# Patient Record
Sex: FEMALE | Race: WHITE | NOT HISPANIC OR LATINO
[De-identification: names, ages, dates, MRNs, and addresses within clinical notes are randomized per-mention and may not be internally consistent; named-entity substitution may affect disease eponyms.]

---

## 2019-02-27 ENCOUNTER — APPOINTMENT (OUTPATIENT)
Dept: ORTHOPEDIC SURGERY | Facility: CLINIC | Age: 84
End: 2019-02-27
Payer: MEDICARE

## 2019-02-27 VITALS — BODY MASS INDEX: 30.73 KG/M2 | HEIGHT: 64 IN | WEIGHT: 180 LBS

## 2019-02-27 VITALS — DIASTOLIC BLOOD PRESSURE: 78 MMHG | SYSTOLIC BLOOD PRESSURE: 148 MMHG | HEART RATE: 88 BPM

## 2019-02-27 DIAGNOSIS — M17.10 UNILATERAL PRIMARY OSTEOARTHRITIS, UNSPECIFIED KNEE: ICD-10-CM

## 2019-02-27 PROCEDURE — 99203 OFFICE O/P NEW LOW 30 MIN: CPT

## 2019-02-27 PROCEDURE — 73562 X-RAY EXAM OF KNEE 3: CPT

## 2019-02-27 PROCEDURE — 20610 DRAIN/INJ JOINT/BURSA W/O US: CPT

## 2019-02-27 RX ORDER — CLONIDINE HYDROCHLORIDE 0.1 MG/1
0.1 TABLET ORAL
Qty: 270 | Refills: 0 | Status: ACTIVE | COMMUNITY
Start: 2018-06-06

## 2019-02-27 RX ORDER — FUROSEMIDE 20 MG/1
20 TABLET ORAL
Qty: 15 | Refills: 0 | Status: ACTIVE | COMMUNITY
Start: 2017-10-19

## 2019-02-27 RX ORDER — WARFARIN 3 MG/1
3 TABLET ORAL
Qty: 30 | Refills: 0 | Status: ACTIVE | COMMUNITY
Start: 2018-02-02

## 2019-02-27 RX ORDER — METOPROLOL SUCCINATE 25 MG/1
25 TABLET, EXTENDED RELEASE ORAL
Qty: 30 | Refills: 0 | Status: ACTIVE | COMMUNITY
Start: 2017-10-05

## 2019-02-27 RX ORDER — AMLODIPINE BESYLATE 10 MG/1
10 TABLET ORAL
Qty: 30 | Refills: 0 | Status: ACTIVE | COMMUNITY
Start: 2017-10-19

## 2019-02-27 RX ORDER — SIMVASTATIN 10 MG/1
10 TABLET, FILM COATED ORAL
Qty: 30 | Refills: 0 | Status: ACTIVE | COMMUNITY
Start: 2017-10-25

## 2019-02-27 NOTE — HISTORY OF PRESENT ILLNESS
[de-identified] : 83 yo female presents with her daughter and grandson for initial evaluation chronic left knee pain, becoming  progressively worse over the past few years.   Pain is primarily about the medial and patellar aspect of the knee.  She has pain with ambulation, is able to walk only about 10 feet before stopping to rest.  Pain interferes with sleep.  She ambulates with a cane indoors and out.  S/p left knee arthroscopy at [facility] in 2006. Has received annual cortisone injections, most recently administered 9/26/18.  The injections are becoming less and less effective.  \par \par PMHx:\par A Fibrillation on Warfarin\par CVA in 2012\par \par

## 2019-02-27 NOTE — DISCUSSION/SUMMARY
[de-identified] : Advanced arthritis left knee. Poor candidate/ high risk for knee replacement surgery. Recommend conservative care. Aspiration/cortisone injection today for pain relief. Physical therapy prescribed. Prescribed a walker for ambulation support.

## 2019-02-27 NOTE — PHYSICAL EXAM
[Antalgic] : antalgic [Cane] : ambulates with cane [LE] : Sensory: Intact in bilateral lower extremities [DP] : dorsalis pedis 2+ and symmetric bilaterally [Knee Anterior Drawer Sign Right] : negative anterior drawer sign [Knee Posterior Drawer Sign Right] : negative posterior drawer sign [Knee Instability Laxity Right Anterior Cruciate Ligament] : negative Lachman's test [Knee Meniscal Integ Norbert's Displace Test Right Medial] : negative Norbert's medially [Knee Meniscal Integ Norbert's Displace Test Right Lateral] : negative Norbert's laterally [Knee Medial Instability Right] : no laxity on valgus stress [Knee Lateral Instability Right] : no laxity on varus stress [Knee Swelling Left] : swelling [Knee Tenderness On Palpation Left] : tenderness [Patellofemoral Apprehension Test Left] : negative patellofemoral apprehension test [Knee Anterior Drawer Sign Left] : negative anterior drawer sign [Knee Posterior Drawer Sign Left] : negative posterior drawer sign [Knee Instability Laxity Left Anterior Cruciate Ligament] : negative Lachman's test [Knee Meniscal Integ Norbert's Displace Test Left Medial] : positive Norbert's medially [Knee Meniscal Integ Norbert's Displace Test Left Lateral] : negative Norbert's laterally [Knee Tender On Palp With Quadriceps Contracted (Shrug Sign)] : positive patellar grind [Knee Medial Instability Left] : no laxity on valgus stress [Knee Lateral Instability Left] : no  laxity on varus stress [Normal RLE] : Right Lower Extremity: No scars, rashes, lesions, ulcers, skin intact [Normal LLE] : Left Lower Extremity: No scars, rashes, lesions, ulcers, skin intact [Normal] : Alert and in no acute distress [de-identified] : Varus deformity left knee with  large effusion/knee warmth. 5-90° knee flexion [de-identified] : X-rays left knee AP, lateral and patellar merchant views revealed advanced tricompartmental degenerative arthritis changes with bone-on-bone medial compartment varus deformity

## 2019-02-27 NOTE — PROCEDURE
[de-identified] : The left knee was sterilely cleansed with alcohol and Betadine swabs. The knee joint was then aspirated for 30 cc joint fluid. The fluid appeared clear and yellow. The joint was then injected via a lateral approach with a 3 cc mixture of 1% lidocaine and 40 mg of Depo-Medrol. The procedure was well tolerated. Instructions were given to apply ice to the site if there is post injection site soreness.

## 2022-12-23 ENCOUNTER — APPOINTMENT (OUTPATIENT)
Dept: ORTHOPEDIC SURGERY | Facility: CLINIC | Age: 87
End: 2022-12-23
Payer: MEDICARE

## 2022-12-23 VITALS
HEIGHT: 65 IN | TEMPERATURE: 97.7 F | WEIGHT: 170 LBS | DIASTOLIC BLOOD PRESSURE: 60 MMHG | HEART RATE: 83 BPM | BODY MASS INDEX: 28.32 KG/M2 | OXYGEN SATURATION: 96 % | SYSTOLIC BLOOD PRESSURE: 187 MMHG

## 2022-12-23 DIAGNOSIS — S80.01XA CONTUSION OF RIGHT KNEE, INITIAL ENCOUNTER: ICD-10-CM

## 2022-12-23 PROCEDURE — 99213 OFFICE O/P EST LOW 20 MIN: CPT

## 2022-12-23 PROCEDURE — 73562 X-RAY EXAM OF KNEE 3: CPT | Mod: RT

## 2023-01-13 ENCOUNTER — APPOINTMENT (OUTPATIENT)
Dept: ORTHOPEDIC SURGERY | Facility: CLINIC | Age: 88
End: 2023-01-13
Payer: MEDICARE

## 2023-01-13 VITALS
SYSTOLIC BLOOD PRESSURE: 178 MMHG | HEART RATE: 76 BPM | TEMPERATURE: 97.4 F | DIASTOLIC BLOOD PRESSURE: 72 MMHG | OXYGEN SATURATION: 97 % | HEIGHT: 65 IN | WEIGHT: 170 LBS | BODY MASS INDEX: 28.32 KG/M2

## 2023-01-13 DIAGNOSIS — M17.11 UNILATERAL PRIMARY OSTEOARTHRITIS, RIGHT KNEE: ICD-10-CM

## 2023-01-13 DIAGNOSIS — S83.91XA SPRAIN OF UNSPECIFIED SITE OF RIGHT KNEE, INITIAL ENCOUNTER: ICD-10-CM

## 2023-01-13 DIAGNOSIS — M17.12 UNILATERAL PRIMARY OSTEOARTHRITIS, LEFT KNEE: ICD-10-CM

## 2023-01-13 PROCEDURE — 99212 OFFICE O/P EST SF 10 MIN: CPT

## 2024-03-18 ENCOUNTER — INPATIENT (INPATIENT)
Facility: HOSPITAL | Age: 89
LOS: 3 days | Discharge: HOME | DRG: 242 | End: 2024-03-22
Attending: STUDENT IN AN ORGANIZED HEALTH CARE EDUCATION/TRAINING PROGRAM | Admitting: INTERNAL MEDICINE
Payer: MEDICARE

## 2024-03-18 VITALS — WEIGHT: 177.91 LBS | RESPIRATION RATE: 14 BRPM | HEIGHT: 65 IN | HEART RATE: 32 BPM

## 2024-03-18 DIAGNOSIS — R00.1 BRADYCARDIA, UNSPECIFIED: ICD-10-CM

## 2024-03-18 LAB
ADD ON TEST-SPECIMEN IN LAB: SIGNIFICANT CHANGE UP
ADD ON TEST-SPECIMEN IN LAB: SIGNIFICANT CHANGE UP
ALBUMIN SERPL ELPH-MCNC: 4 G/DL — SIGNIFICANT CHANGE UP (ref 3.3–5)
ALBUMIN SERPL ELPH-MCNC: 4 G/DL — SIGNIFICANT CHANGE UP (ref 3.3–5)
ALP SERPL-CCNC: 93 U/L — SIGNIFICANT CHANGE UP (ref 40–120)
ALP SERPL-CCNC: 93 U/L — SIGNIFICANT CHANGE UP (ref 40–120)
ALT FLD-CCNC: 22 U/L — SIGNIFICANT CHANGE UP (ref 10–45)
ALT FLD-CCNC: 23 U/L — SIGNIFICANT CHANGE UP (ref 10–45)
ANION GAP SERPL CALC-SCNC: 13 MMOL/L — SIGNIFICANT CHANGE UP (ref 5–17)
ANION GAP SERPL CALC-SCNC: 15 MMOL/L — SIGNIFICANT CHANGE UP (ref 5–17)
APTT BLD: 27 SEC — SIGNIFICANT CHANGE UP (ref 24.5–35.6)
AST SERPL-CCNC: 24 U/L — SIGNIFICANT CHANGE UP (ref 10–40)
AST SERPL-CCNC: 26 U/L — SIGNIFICANT CHANGE UP (ref 10–40)
BASE EXCESS BLDV CALC-SCNC: -0.3 MMOL/L — SIGNIFICANT CHANGE UP (ref -2–3)
BASE EXCESS BLDV CALC-SCNC: -1.5 MMOL/L — SIGNIFICANT CHANGE UP (ref -2–3)
BASE EXCESS BLDV CALC-SCNC: -7.6 MMOL/L — LOW (ref -2–3)
BASOPHILS # BLD AUTO: 0.01 K/UL — SIGNIFICANT CHANGE UP (ref 0–0.2)
BASOPHILS NFR BLD AUTO: 0.1 % — SIGNIFICANT CHANGE UP (ref 0–2)
BILIRUB SERPL-MCNC: 0.4 MG/DL — SIGNIFICANT CHANGE UP (ref 0.2–1.2)
BILIRUB SERPL-MCNC: 0.5 MG/DL — SIGNIFICANT CHANGE UP (ref 0.2–1.2)
BUN SERPL-MCNC: 39 MG/DL — HIGH (ref 7–23)
BUN SERPL-MCNC: 41 MG/DL — HIGH (ref 7–23)
CA-I SERPL-SCNC: 1.25 MMOL/L — SIGNIFICANT CHANGE UP (ref 1.15–1.33)
CA-I SERPL-SCNC: 1.4 MMOL/L — HIGH (ref 1.15–1.33)
CA-I SERPL-SCNC: SIGNIFICANT CHANGE UP MMOL/L (ref 1.15–1.33)
CALCIUM SERPL-MCNC: 10.2 MG/DL — SIGNIFICANT CHANGE UP (ref 8.4–10.5)
CALCIUM SERPL-MCNC: 9.7 MG/DL — SIGNIFICANT CHANGE UP (ref 8.4–10.5)
CHLORIDE BLDV-SCNC: 101 MMOL/L — SIGNIFICANT CHANGE UP (ref 96–108)
CHLORIDE BLDV-SCNC: 103 MMOL/L — SIGNIFICANT CHANGE UP (ref 96–108)
CHLORIDE BLDV-SCNC: 103 MMOL/L — SIGNIFICANT CHANGE UP (ref 96–108)
CHLORIDE SERPL-SCNC: 101 MMOL/L — SIGNIFICANT CHANGE UP (ref 96–108)
CHLORIDE SERPL-SCNC: 102 MMOL/L — SIGNIFICANT CHANGE UP (ref 96–108)
CK MB BLD-MCNC: 8.7 % — HIGH (ref 0–3.5)
CK MB BLD-MCNC: 9.6 % — HIGH (ref 0–3.5)
CK MB CFR SERPL CALC: 5.4 NG/ML — HIGH (ref 0–3.8)
CK MB CFR SERPL CALC: 5.5 NG/ML — HIGH (ref 0–3.8)
CK SERPL-CCNC: 56 U/L — SIGNIFICANT CHANGE UP (ref 25–170)
CK SERPL-CCNC: 63 U/L — SIGNIFICANT CHANGE UP (ref 25–170)
CO2 BLDV-SCNC: 21 MMOL/L — LOW (ref 22–26)
CO2 BLDV-SCNC: 27 MMOL/L — HIGH (ref 22–26)
CO2 BLDV-SCNC: 28 MMOL/L — HIGH (ref 22–26)
CO2 SERPL-SCNC: 22 MMOL/L — SIGNIFICANT CHANGE UP (ref 22–31)
CO2 SERPL-SCNC: 23 MMOL/L — SIGNIFICANT CHANGE UP (ref 22–31)
CREAT SERPL-MCNC: 1.92 MG/DL — HIGH (ref 0.5–1.3)
CREAT SERPL-MCNC: 1.96 MG/DL — HIGH (ref 0.5–1.3)
EGFR: 24 ML/MIN/1.73M2 — LOW
EGFR: 25 ML/MIN/1.73M2 — LOW
EOSINOPHIL # BLD AUTO: 0.01 K/UL — SIGNIFICANT CHANGE UP (ref 0–0.5)
EOSINOPHIL NFR BLD AUTO: 0.1 % — SIGNIFICANT CHANGE UP (ref 0–6)
FLUAV AG NPH QL: SIGNIFICANT CHANGE UP
FLUBV AG NPH QL: SIGNIFICANT CHANGE UP
GAS PNL BLDV: 127 MMOL/L — LOW (ref 136–145)
GAS PNL BLDV: 136 MMOL/L — SIGNIFICANT CHANGE UP (ref 136–145)
GAS PNL BLDV: 137 MMOL/L — SIGNIFICANT CHANGE UP (ref 136–145)
GAS PNL BLDV: SIGNIFICANT CHANGE UP
GLUCOSE BLDV-MCNC: 123 MG/DL — HIGH (ref 70–99)
GLUCOSE BLDV-MCNC: 163 MG/DL — HIGH (ref 70–99)
GLUCOSE BLDV-MCNC: 197 MG/DL — HIGH (ref 70–99)
GLUCOSE SERPL-MCNC: 155 MG/DL — HIGH (ref 70–99)
GLUCOSE SERPL-MCNC: 200 MG/DL — HIGH (ref 70–99)
HCO3 BLDV-SCNC: 19 MMOL/L — LOW (ref 22–29)
HCO3 BLDV-SCNC: 26 MMOL/L — SIGNIFICANT CHANGE UP (ref 22–29)
HCO3 BLDV-SCNC: 26 MMOL/L — SIGNIFICANT CHANGE UP (ref 22–29)
HCT VFR BLD CALC: 31.9 % — LOW (ref 34.5–45)
HCT VFR BLDA CALC: 24 % — LOW (ref 34.5–46.5)
HCT VFR BLDA CALC: 30 % — LOW (ref 34.5–46.5)
HCT VFR BLDA CALC: 31 % — LOW (ref 34.5–46.5)
HGB BLD CALC-MCNC: 10 G/DL — LOW (ref 11.7–16.1)
HGB BLD CALC-MCNC: 10.4 G/DL — LOW (ref 11.7–16.1)
HGB BLD CALC-MCNC: 8 G/DL — LOW (ref 11.7–16.1)
HGB BLD-MCNC: 10 G/DL — LOW (ref 11.5–15.5)
IMM GRANULOCYTES NFR BLD AUTO: 1.6 % — HIGH (ref 0–0.9)
INR BLD: 1.33 RATIO — HIGH (ref 0.85–1.18)
LACTATE BLDV-MCNC: 1.4 MMOL/L — SIGNIFICANT CHANGE UP (ref 0.5–2)
LACTATE BLDV-MCNC: 1.7 MMOL/L — SIGNIFICANT CHANGE UP (ref 0.5–2)
LACTATE BLDV-MCNC: 2.7 MMOL/L — HIGH (ref 0.5–2)
LIDOCAIN IGE QN: 127 U/L — HIGH (ref 7–60)
LYMPHOCYTES # BLD AUTO: 0.97 K/UL — LOW (ref 1–3.3)
LYMPHOCYTES # BLD AUTO: 6.2 % — LOW (ref 13–44)
MAGNESIUM SERPL-MCNC: 2.6 MG/DL — SIGNIFICANT CHANGE UP (ref 1.6–2.6)
MCHC RBC-ENTMCNC: 28.5 PG — SIGNIFICANT CHANGE UP (ref 27–34)
MCHC RBC-ENTMCNC: 31.3 GM/DL — LOW (ref 32–36)
MCV RBC AUTO: 90.9 FL — SIGNIFICANT CHANGE UP (ref 80–100)
MONOCYTES # BLD AUTO: 0.81 K/UL — SIGNIFICANT CHANGE UP (ref 0–0.9)
MONOCYTES NFR BLD AUTO: 5.2 % — SIGNIFICANT CHANGE UP (ref 2–14)
NEUTROPHILS # BLD AUTO: 13.56 K/UL — HIGH (ref 1.8–7.4)
NEUTROPHILS NFR BLD AUTO: 86.8 % — HIGH (ref 43–77)
NRBC # BLD: 0 /100 WBCS — SIGNIFICANT CHANGE UP (ref 0–0)
NT-PROBNP SERPL-SCNC: HIGH PG/ML (ref 0–300)
PCO2 BLDV: 45 MMHG — HIGH (ref 39–42)
PCO2 BLDV: 51 MMHG — HIGH (ref 39–42)
PCO2 BLDV: 53 MMHG — HIGH (ref 39–42)
PH BLDV: 7.24 — LOW (ref 7.32–7.43)
PH BLDV: 7.29 — LOW (ref 7.32–7.43)
PH BLDV: 7.32 — SIGNIFICANT CHANGE UP (ref 7.32–7.43)
PLATELET # BLD AUTO: 473 K/UL — HIGH (ref 150–400)
PO2 BLDV: 26 MMHG — SIGNIFICANT CHANGE UP (ref 25–45)
PO2 BLDV: 35 MMHG — SIGNIFICANT CHANGE UP (ref 25–45)
PO2 BLDV: 43 MMHG — SIGNIFICANT CHANGE UP (ref 25–45)
POTASSIUM BLDV-SCNC: 3.5 MMOL/L — SIGNIFICANT CHANGE UP (ref 3.5–5.1)
POTASSIUM BLDV-SCNC: 5.1 MMOL/L — SIGNIFICANT CHANGE UP (ref 3.5–5.1)
POTASSIUM BLDV-SCNC: 5.7 MMOL/L — HIGH (ref 3.5–5.1)
POTASSIUM SERPL-MCNC: 4.8 MMOL/L — SIGNIFICANT CHANGE UP (ref 3.5–5.3)
POTASSIUM SERPL-MCNC: 4.9 MMOL/L — SIGNIFICANT CHANGE UP (ref 3.5–5.3)
POTASSIUM SERPL-SCNC: 4.8 MMOL/L — SIGNIFICANT CHANGE UP (ref 3.5–5.3)
POTASSIUM SERPL-SCNC: 4.9 MMOL/L — SIGNIFICANT CHANGE UP (ref 3.5–5.3)
PROT SERPL-MCNC: 7.2 G/DL — SIGNIFICANT CHANGE UP (ref 6–8.3)
PROT SERPL-MCNC: 7.3 G/DL — SIGNIFICANT CHANGE UP (ref 6–8.3)
PROTHROM AB SERPL-ACNC: 13.8 SEC — HIGH (ref 9.5–13)
RBC # BLD: 3.51 M/UL — LOW (ref 3.8–5.2)
RBC # FLD: 16.2 % — HIGH (ref 10.3–14.5)
RSV RNA NPH QL NAA+NON-PROBE: SIGNIFICANT CHANGE UP
SAO2 % BLDV: 36 % — LOW (ref 67–88)
SAO2 % BLDV: 54.2 % — LOW (ref 67–88)
SAO2 % BLDV: 68.3 % — SIGNIFICANT CHANGE UP (ref 67–88)
SARS-COV-2 RNA SPEC QL NAA+PROBE: SIGNIFICANT CHANGE UP
SODIUM SERPL-SCNC: 138 MMOL/L — SIGNIFICANT CHANGE UP (ref 135–145)
SODIUM SERPL-SCNC: 138 MMOL/L — SIGNIFICANT CHANGE UP (ref 135–145)
TROPONIN T, HIGH SENSITIVITY RESULT: 74 NG/L — HIGH (ref 0–51)
TROPONIN T, HIGH SENSITIVITY RESULT: 74 NG/L — HIGH (ref 0–51)
WBC # BLD: 15.61 K/UL — HIGH (ref 3.8–10.5)
WBC # FLD AUTO: 15.61 K/UL — HIGH (ref 3.8–10.5)

## 2024-03-18 PROCEDURE — 71250 CT THORAX DX C-: CPT | Mod: 26

## 2024-03-18 PROCEDURE — 93010 ELECTROCARDIOGRAM REPORT: CPT

## 2024-03-18 PROCEDURE — 93308 TTE F-UP OR LMTD: CPT | Mod: 26

## 2024-03-18 PROCEDURE — 99291 CRITICAL CARE FIRST HOUR: CPT

## 2024-03-18 PROCEDURE — 76937 US GUIDE VASCULAR ACCESS: CPT | Mod: 26,59

## 2024-03-18 PROCEDURE — 71045 X-RAY EXAM CHEST 1 VIEW: CPT | Mod: 26

## 2024-03-18 PROCEDURE — 36000 PLACE NEEDLE IN VEIN: CPT

## 2024-03-18 RX ORDER — FUROSEMIDE 40 MG
1 TABLET ORAL
Refills: 0 | DISCHARGE

## 2024-03-18 RX ORDER — IPRATROPIUM/ALBUTEROL SULFATE 18-103MCG
3 AEROSOL WITH ADAPTER (GRAM) INHALATION ONCE
Refills: 0 | Status: COMPLETED | OUTPATIENT
Start: 2024-03-18 | End: 2024-03-18

## 2024-03-18 RX ORDER — WARFARIN SODIUM 2.5 MG/1
1 TABLET ORAL
Refills: 0 | DISCHARGE

## 2024-03-18 RX ORDER — CALCIUM GLUCONATE 100 MG/ML
2 VIAL (ML) INTRAVENOUS ONCE
Refills: 0 | Status: DISCONTINUED | OUTPATIENT
Start: 2024-03-18 | End: 2024-03-18

## 2024-03-18 RX ORDER — FUROSEMIDE 40 MG
20 TABLET ORAL ONCE
Refills: 0 | Status: DISCONTINUED | OUTPATIENT
Start: 2024-03-18 | End: 2024-03-18

## 2024-03-18 RX ORDER — FUROSEMIDE 40 MG
40 TABLET ORAL ONCE
Refills: 0 | Status: COMPLETED | OUTPATIENT
Start: 2024-03-18 | End: 2024-03-18

## 2024-03-18 RX ORDER — SIMVASTATIN 20 MG/1
1 TABLET, FILM COATED ORAL
Refills: 0 | DISCHARGE

## 2024-03-18 RX ORDER — AMLODIPINE BESYLATE 2.5 MG/1
1 TABLET ORAL
Refills: 0 | DISCHARGE

## 2024-03-18 RX ORDER — CHLORHEXIDINE GLUCONATE 213 G/1000ML
1 SOLUTION TOPICAL AT BEDTIME
Refills: 0 | Status: DISCONTINUED | OUTPATIENT
Start: 2024-03-18 | End: 2024-03-22

## 2024-03-18 RX ADMIN — Medication 3 MILLILITER(S): at 20:31

## 2024-03-18 RX ADMIN — Medication 3 MILLILITER(S): at 19:00

## 2024-03-18 RX ADMIN — Medication 200 GRAM(S): at 20:09

## 2024-03-18 RX ADMIN — Medication 3 MILLILITER(S): at 20:04

## 2024-03-18 RX ADMIN — Medication 40 MILLIGRAM(S): at 20:00

## 2024-03-18 RX ADMIN — Medication 40 MILLIGRAM(S): at 19:54

## 2024-03-18 NOTE — ED ADULT NURSE NOTE - OBJECTIVE STATEMENT
89 y/o female PMH HTN, CVA presents to ED from cardiologist's office for hypoxia and bradycardia. Pt's daughter at bedside states she had a cold int he beginning of March and was diagnosed with PNA. Went to Dr. Goldberg today for check up, found ot be stefan to the 30s and hypoxic to 80s. Arrived through ED waiting room. Pt arrived hypoxic to 87%, stefan to 32. Placed on 2L O2 and improved to 97%. Placed on pacer pads. Gross motor and neuro intact. Appears increasingly lethargic. 20G IV placed LAC. Safety and comfort provided. Family at bedside.

## 2024-03-18 NOTE — H&P ADULT - ASSESSMENT
89 year old female with PMH of HTN, prior CVA, and COPD presented to the ED from her Cardiologist office with hypoxia and bradycardia.  As per daughter, pt has been having low HRs in 30-40 was taken of Toprol, she had been refusing PPM in the past.  She has been having worsening SOB recently, was treated for pneumonia with Augmentin.  EKG showing HR in 25-30s, junctional stefan with slow ventricular response and LBBB.  Admitted to [facility] for further management.  Pt is refusing TVP placement at this time.      #Neuro   A+Ox3   -no focal deficits     #Pulm   Hx COPD  -currently on NC, saturating >90%   -nebulizer q6hrs for wheezing   -CT chest completed, CXR Trace bilateral pleural effusions  -s/p Lasix 40 IVP     #Cardiac   Bradycardia   -Rates between 25-40  -Refusing TVP at this time, transcutaneous pads applied   -avoid AV lexii blockers at this time   -TTE in the AM   -NPO for possible PPM in the AM    Afib   -home warfarin, INR 1.33  -start heparin gtt per protocol  -holding rate controlled     #Renal   DARREL  -Unknown baseline, SCr 1.92 on admission  -Continue to trend BUN/SCr  -avoid nephrotoxic meds, renally dose   -supplement electrolyte as tolerated, keep K>4.0 Mg >2.0    #GI  NPO post midnight   -no active issues     #Heme   stable H/H   -no active issues     #ID   leukocytosis   -in setting of bradycardia   -afebrile  -blood cultures x2 sets sent  -Monitor off antibiotics     #Endo  -f/u hgb A1c on admission

## 2024-03-18 NOTE — PATIENT PROFILE ADULT - FALL HARM RISK - HARM RISK INTERVENTIONS

## 2024-03-18 NOTE — H&P ADULT - NSHPPHYSICALEXAM_GEN_ALL_CORE
CONSTITUTIONAL: Well groomed, no apparent distress  EYES: PERRLA and symmetric, EOMI, No conjunctival or scleral injection, non-icteric  ENMT: Oral mucosa with moist membranes. Normal dentition; no pharyngeal injection or exudates  NECK: Supple, symmetric and without tracheal deviation   RESP: No respiratory distress, no use of accessory muscles; CTA b/l, no WRR  CV: RRR, +S1S2, no MRG; no JVD; no peripheral edema  GI: Soft, NT, ND, no rebound, no guarding; no palpable masses; no hepatosplenomegaly; no hernia palpated  SKIN: No rashes or ulcers noted; no subcutaneous nodules or induration palpable   PSYCH: Appropriate insight/judgment; A+O x 3, mood and affect appropriate, recent/remote memory intact

## 2024-03-18 NOTE — CHART NOTE - NSCHARTNOTEFT_GEN_A_CORE
Called by ED for bradycardia to 25-30s, BP hypertensive to normotensive, hypoxic to 92% on 2L NC. On review of ECG and tele, appears c/w junctional bradycardia with slow VR vs a fib with slow VR. Patient with LBBB on ECG, this is old per pts daughter at bedside. Repeat ECG with iRBBB morphology. Patients daughter reports patient has been recommended for a PPM in the past but the patient refused. Patient also has held her home Lasix for the past few days because she did not feel she needs to take it.    Due to concern for unstable bradycardic rhythm, recommended CICU admission and TVP placement overnight with PPM eval in AM. Explained TVP procedure, benefits, and risks to patients daughter, who refused placement at this time and wishes to observe until PPM discussion is had. Reiterated that patient could decompensate into worsening bradycardia causing cardiac arrest which may not be reversible in time. Patients daughter understood risks of worsening clinical decompensation and still wishes to withhold TVP at this time.    Recommendations:  - Admit to CICU for close tele monitoring and management of likely HF exacerbation  - Will readdress TVP with patient and daughter if any clinical changes overnight  - NPO at MN for possible PPM. Patient follows with private EP Hardy Reich, please contact him in AM for formal EP consult  - Continue GOC conversations, patients daughter stated she would likely not want a ventilator for her mother but does not fully agree to DNR at this time    Forrest Saravia MD  Cardiology Fellow Called by ED for bradycardia to 25-30s, BP hypertensive to normotensive, hypoxic to 92% on 2L NC. On review of ECG and tele, appears c/w junctional bradycardia with slow VR vs afib with slow VR vs afib with CHB. Patient with LBBB on ECG, this is old per pts daughter at bedside. Repeat ECG with iRBBB morphology. Patients daughter reports patient has been recommended for a PPM in the past but the patient refused. Patient also has held her home Lasix for the past few days because she did not feel she needs to take it.    Due to concern for unstable bradycardic rhythm, recommended CICU admission and TVP placement overnight with PPM eval in AM. Explained TVP procedure, benefits, and risks to patients daughter, who refused placement at this time and wishes to observe until PPM discussion is had. Reiterated that patient could decompensate into worsening bradycardia causing cardiac arrest which may not be reversible in time. Patients daughter understood risks of worsening clinical decompensation and still wishes to withhold TVP at this time.    Recommendations:  - Admit to CICU for close tele monitoring and management of likely HF exacerbation  - Will readdress TVP with patient and daughter if any clinical changes overnight  - NPO at MN for possible PPM. Patient follows with private EP Hardy Reich, please contact him in AM for formal EP consult  - Continue GOC conversations, patients daughter stated she would likely not want a ventilator for her mother but does not fully agree to DNR at this time    Forrest Saravia MD  Cardiology Fellow

## 2024-03-18 NOTE — ED ADULT NURSE NOTE - DOES PATIENT HAVE ADVANCE DIRECTIVE
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

## 2024-03-18 NOTE — ED ADULT NURSE NOTE - NSFALLHARMRISKINTERV_ED_ALL_ED
Assistance OOB with selected safe patient handling equipment if applicable/Assistance with ambulation/Communicate risk of Fall with Harm to all staff, patient, and family/Monitor gait and stability/Provide visual cue: red socks, yellow wristband, yellow gown, etc/Reinforce activity limits and safety measures with patient and family/Bed in lowest position, wheels locked, appropriate side rails in place/Call bell, personal items and telephone in reach/Instruct patient to call for assistance before getting out of bed/chair/stretcher/Non-slip footwear applied when patient is off stretcher/[place] to call system/Physically safe environment - no spills, clutter or unnecessary equipment/Purposeful Proactive Rounding/Room/bathroom lighting operational, light cord in reach

## 2024-03-18 NOTE — ED PROVIDER NOTE - CLINICAL SUMMARY MEDICAL DECISION MAKING FREE TEXT BOX
Rangel Gonzalez, DO PGY-3: 89-year-old female past medical history of hypertension, CVA, A-fib on warfarin presents to the ED sent in from PMDs office for bradycardia.  Patient bradycardic in the 30s, EKG showing ventricular escape rhythm, patient grossly fluid overloaded and hypoxic.  Concern for new onset heart failure, electrolyte abnormality lower suspicion for infection.  Will place patient on pacer pads, labs, diuresis, EP consult, telemetry, EKG.  Reassess Rangel Gonzalez,  PGY-3: 89-year-old female past medical history of hypertension, CVA, A-fib on warfarin presents to the ED sent in from PMDs office for bradycardia.  Patient bradycardic in the 30s, EKG showing ventricular escape rhythm, patient grossly fluid overloaded and hypoxic.  Concern for new onset heart failure, electrolyte abnormality lower suspicion for infection.  Will place patient on pacer pads, labs, diuresis, EP consult, telemetry, EKG.  Reassess  Attending Mansi Robledo: 89-year-old female with multiple medical issues including history of high blood pressure, prior CVA, atrial fibrillation on Coumadin however is not taking right now as previously had supratherapeutic INR is presenting with concern for shortness of breath, weakness and difficulty breathing.  Upon arrival patient was found to be bradycardic into the 30s and 20s.  Blood pressure was elevated and extremities warm and well-perfused upon arrival.  On exam patient was awake and alert following commands, patient had diffuse wheezing bilaterally, abdomen was soft and nontender, patient's heart was bradycardic with a slight murmur, and patient found to have lower extremity pitting edema.  Per family member was also recently treated for pneumonia.  EKG performed showing evidence of significant bradycardia.  Patient is on amlodipine and is not on digoxin.  Discussed with family who is at bedside and patient was told that she needed a pacemaker while ago however patient at that time did not want to have the procedure performed.  Patient placed on pads.  Point-of-care ultrasound performed showing B-lines, plethoric IVC, dilated left atrium.  Concern for combination of reactive airway disease consider possible COPD exacerbation contributing to shortness of breath as well as fluid overload.  EP consulted concern for significant bradycardia arrhythmia.  Will obtain labs, check potassium, VBG, cardiac enzymes and proBNP.  Patient afebrile in the emergency department and no productive cough making residual pneumonia less likely.  Will also check for urinary tract infection.  Discussed with family who want the pacemaker performed at this time.  Discussed with the EP will admit to the CICU for further evaluation and monitoring.  Patient given DuoNebs as well as steroids.  Additionally patient also given Lasix.  Will monitor urine output in the setting of concern for fluid overload.  Patient will need admission to the ICU. Rangel Gonzalez,  PGY-3: 89-year-old female past medical history of hypertension, CVA, A-fib on warfarin presents to the ED sent in from PMDs office for bradycardia.  Patient bradycardic in the 30s, EKG showing ventricular escape rhythm, patient grossly fluid overloaded and hypoxic.  Concern for new onset heart failure, electrolyte abnormality lower suspicion for infection.  Will place patient on pacer pads, labs, diuresis, EP consult, telemetry, EKG.  Reassess  Attending Mansi Robledo: 89-year-old female with multiple medical issues including history of high blood pressure, prior CVA, atrial fibrillation on Coumadin however is not taking right now as previously had supratherapeutic INR is presenting with concern for shortness of breath, weakness and difficulty breathing.  Upon arrival patient was found to be bradycardic into the 30s and 20s.  concern for complete heart block based on ekg. Blood pressure was elevated and extremities warm and well-perfused upon arrival.  On exam patient was awake and alert following commands, patient had diffuse wheezing bilaterally, abdomen was soft and nontender, patient's heart was bradycardic with a slight murmur, and patient found to have lower extremity pitting edema.  Per family member was also recently treated for pneumonia.  Patient is on amlodipine and is not on digoxin.  Discussed with family who is at bedside and patient was told that she needed a pacemaker while ago however patient at that time did not want to have the procedure performed.  Patient placed on pads.  Point-of-care ultrasound performed showing B-lines, plethoric IVC, dilated left atrium.  Concern for combination of reactive airway disease consider possible COPD exacerbation contributing to shortness of breath as well as fluid overload.  EP consulted concern for significant bradycardia arrhythmia.  Will obtain labs, check potassium, VBG, cardiac enzymes and proBNP.  Patient afebrile in the emergency department and no productive cough making residual pneumonia less likely.  Will also check for urinary tract infection.  Discussed with family who want the pacemaker performed at this time.  Discussed with the EP will admit to the CICU for further evaluation and monitoring.  Patient given DuoNebs as well as steroids.  Additionally patient also given Lasix.  Will monitor urine output in the setting of concern for fluid overload.  Patient will need admission to the ICU.

## 2024-03-18 NOTE — ED PROVIDER NOTE - SECONDARY DIAGNOSIS.
Spoke with patient in regards to scheduling a hospital follow up. Patient will be coming in to see Maria Alejandra Strong on 3/20/24 for hospital follow up.    Is requesting a prescription for a nebulizer.  Has a referral to see Pulmonology. Informed patient that she needs to schedule that appointment as soon as possible.    CHB (complete heart block) Acute hypoxemic respiratory failure

## 2024-03-18 NOTE — ED PROVIDER NOTE - PROGRESS NOTE DETAILS
Rangel Gonzalez, DO PGY-3: Patient's heart rate persistently in the high 20s low 30s, patient hypoxic with acceptable saturation on nasal cannula.  Due to patient's persistent bradycardia will admit patient to CICU.

## 2024-03-18 NOTE — ED PROVIDER NOTE - ATTENDING CONTRIBUTION TO CARE
Attending MD Mansi Robledo:  I personally have seen and examined this patient.  Resident note reviewed and agree on plan of care and except where noted.  See HPI, PE, and MDM for details.

## 2024-03-18 NOTE — ED PROVIDER NOTE - CARE PLAN
1 Principal Discharge DX:	Bradycardia   Principal Discharge DX:	Bradycardia  Secondary Diagnosis:	Acute hypoxemic respiratory failure  Secondary Diagnosis:	CHB (complete heart block)

## 2024-03-18 NOTE — ED PROVIDER NOTE - NS ED MD DISPO DIVISION
Patient is scheduled for a colonoscopy with Dr Moss on 08/11/22. Please send Suprep prep to patient's selected pharmacy.    Please place COVID order if not already placed and test is required    Thank you, GI Preadmit Surgery Scheduler  [facility]

## 2024-03-18 NOTE — ED PROVIDER NOTE - PHYSICAL EXAMINATION
GEN: Patient awake alert NAD.   HEENT: normocephalic, atraumatic, moist MM  CARDIAC: bradycardia, S1, S2, no murmur.   PULM: Diffuse wheezing bilaterally, tachypneic  ABD: soft NT, ND, no rebound no guarding  MSK: Moving all extremities, no edema.   NEURO: A&Ox3, no focal neurological deficits  SKIN: warm, dry, no rash. GEN: Patient awake alert NAD.   HEENT: normocephalic, atraumatic, moist MM  CARDIAC: bradycardia, S1, S2, no murmur.   PULM: Diffuse wheezing bilaterally, tachypneic  ABD: soft NT, ND, no rebound no guarding  MSK: Moving all extremities, no edema.   NEURO: A&Ox3, no focal neurological deficits  SKIN: warm, dry, no rash.tachypnic heent: atrauamtic, eomi, perrla, mmm, o neck; nttp, no nuchal rigidity, chest: nttp, no crepitus, cv: bradycardic, lungs: wheezing bilateraly, abd: soft, nontender, nondistended, no peritoneal signs,  ext: wwp, bl le edema skin: no rash, neuro: awake and alert, following commands, speech clear, sensation and strength intact, no focal deficits

## 2024-03-18 NOTE — ED ADULT NURSE REASSESSMENT NOTE - NS ED NURSE REASSESS COMMENT FT1
Daughter is at bedside, Pt is A&Ox4 pt states feeling better, denies any distress. Pt is on cardiac monitor and pacing pts-zoll machine. Pt currently has upper and lower dentures in place. Daughter was made aware of the need to take pt's dentures when she leaves for safe keeping, or if patient is placed on bipap and RN isnt in the room. Daughter verbalized understanding. Pt placed in position of comfort. Pt educated on call bell system and provided call bell. Bed in lowest position, wheels locked, appropriate side rails raised. Pt denies needs at this time.

## 2024-03-18 NOTE — H&P ADULT - HISTORY OF PRESENT ILLNESS
89 year old female with PMH of HTN, prior CVA, and COPD presented to the ED from her Cardiologist office with hypoxia and bradycardia.  As per daughter, pt has been having low HRs in 30-40 was taken of Toprol, she had been refusing PPM in the past.  She has been having worsening SOB recently, was treated for pneumonia with Augmentin.  EKG showing HR in 20s with new LBB.  89 year old female with PMH of HTN, prior CVA, and COPD presented to the ED from her Cardiologist office with hypoxia and bradycardia.  As per daughter, pt has been having low HRs in 30-40 was taken of Toprol, she had been refusing PPM in the past.  She has been having worsening SOB recently, was treated for pneumonia with Augmentin.  EKG showing HR in 25-30s, junctional stefan with slow ventricular response and LBBB.  Admitted to [facility] for further management.

## 2024-03-18 NOTE — H&P ADULT - NSHPLABSRESULTS_GEN_ALL_CORE
10.0   15.61 )-----------( 473      ( 18 Mar 2024 19:05 )             31.9       03-18    138  |  102  |  41<H>  ----------------------------<  155<H>  4.8   |  23  |  1.96<H>    Ca    10.2      18 Mar 2024 21:24  Phos  4.1     03-18  Mg     2.6     03-18    TPro  7.2  /  Alb  4.0  /  TBili  0.4  /  DBili  x   /  AST  24  /  ALT  22  /  AlkPhos  93  03-18              Urinalysis Basic - ( 18 Mar 2024 21:24 )    Color: x / Appearance: x / SG: x / pH: x  Gluc: 155 mg/dL / Ketone: x  / Bili: x / Urobili: x   Blood: x / Protein: x / Nitrite: x   Leuk Esterase: x / RBC: x / WBC x   Sq Epi: x / Non Sq Epi: x / Bacteria: x        PT/INR - ( 18 Mar 2024 19:32 )   PT: 13.8 sec;   INR: 1.33 ratio         PTT - ( 18 Mar 2024 19:32 )  PTT:27.0 sec    Lactate Trend      CARDIAC MARKERS ( 18 Mar 2024 21:24 )  x     / x     / 56 U/L / x     / 5.4 ng/mL  CARDIAC MARKERS ( 18 Mar 2024 19:05 )  x     / x     / 63 U/L / x     / 5.5 ng/mL        CAPILLARY BLOOD GLUCOSE

## 2024-03-18 NOTE — ED PROVIDER NOTE - OBJECTIVE STATEMENT
Attending Mansi Robledo: 89-year-old female with h/o HTN, prior CVA multimedical issues including history of lung disease, presenting with concern for shortness of breath.  Patient has a history of bradycardia in the past and was told that she might need a pacemaker however patient did not want to have one at this time.  Per daughter patient has been having some worsening shortness of breath and was recently being treated for pneumonia with Augmentin.  Today was weaker and noticed increased work of breathing with normal pulse rate and came to the emergency department.  No recent falls or any trauma.  Patient does do breathing treatments and is currently on steroids at home.  No fevers.  Has lower extremity edema which per family is her baseline.  Is on Lasix but per family think it might be a low dose. sees dr goldberg from cardiology. also is on coumadin have been holding

## 2024-03-18 NOTE — ED PROVIDER NOTE - CONVERSATION DETAILS
d/w daughter MOLST form and goals of care. pt not sure what she wants to do. at this time wants her mother to be full code

## 2024-03-19 ENCOUNTER — RESULT REVIEW (OUTPATIENT)
Age: 89
End: 2024-03-19

## 2024-03-19 DIAGNOSIS — J96.01 ACUTE RESPIRATORY FAILURE WITH HYPOXIA: ICD-10-CM

## 2024-03-19 DIAGNOSIS — Z29.9 ENCOUNTER FOR PROPHYLACTIC MEASURES, UNSPECIFIED: ICD-10-CM

## 2024-03-19 DIAGNOSIS — I48.0 PAROXYSMAL ATRIAL FIBRILLATION: ICD-10-CM

## 2024-03-19 DIAGNOSIS — I44.2 ATRIOVENTRICULAR BLOCK, COMPLETE: ICD-10-CM

## 2024-03-19 DIAGNOSIS — N18.9 CHRONIC KIDNEY DISEASE, UNSPECIFIED: ICD-10-CM

## 2024-03-19 DIAGNOSIS — I50.9 HEART FAILURE, UNSPECIFIED: ICD-10-CM

## 2024-03-19 DIAGNOSIS — I63.9 CEREBRAL INFARCTION, UNSPECIFIED: ICD-10-CM

## 2024-03-19 DIAGNOSIS — R71.0 PRECIPITOUS DROP IN HEMATOCRIT: ICD-10-CM

## 2024-03-19 DIAGNOSIS — I10 ESSENTIAL (PRIMARY) HYPERTENSION: ICD-10-CM

## 2024-03-19 LAB
A1C WITH ESTIMATED AVERAGE GLUCOSE RESULT: 6.6 % — HIGH (ref 4–5.6)
ADD ON TEST-SPECIMEN IN LAB: SIGNIFICANT CHANGE UP
ALBUMIN SERPL ELPH-MCNC: 3.4 G/DL — SIGNIFICANT CHANGE UP (ref 3.3–5)
ALP SERPL-CCNC: 75 U/L — SIGNIFICANT CHANGE UP (ref 40–120)
ALT FLD-CCNC: 20 U/L — SIGNIFICANT CHANGE UP (ref 10–45)
ANION GAP SERPL CALC-SCNC: 11 MMOL/L — SIGNIFICANT CHANGE UP (ref 5–17)
ANION GAP SERPL CALC-SCNC: 14 MMOL/L — SIGNIFICANT CHANGE UP (ref 5–17)
APPEARANCE UR: CLEAR — SIGNIFICANT CHANGE UP
APTT BLD: 32.3 SEC — SIGNIFICANT CHANGE UP (ref 24.5–35.6)
AST SERPL-CCNC: 20 U/L — SIGNIFICANT CHANGE UP (ref 10–40)
BACTERIA # UR AUTO: NEGATIVE /HPF — SIGNIFICANT CHANGE UP
BILIRUB SERPL-MCNC: 0.3 MG/DL — SIGNIFICANT CHANGE UP (ref 0.2–1.2)
BILIRUB UR-MCNC: NEGATIVE — SIGNIFICANT CHANGE UP
BUN SERPL-MCNC: 44 MG/DL — HIGH (ref 7–23)
BUN SERPL-MCNC: 45 MG/DL — HIGH (ref 7–23)
CALCIUM SERPL-MCNC: 9.1 MG/DL — SIGNIFICANT CHANGE UP (ref 8.4–10.5)
CALCIUM SERPL-MCNC: 9.3 MG/DL — SIGNIFICANT CHANGE UP (ref 8.4–10.5)
CAST: 19 /LPF — HIGH (ref 0–4)
CHLORIDE SERPL-SCNC: 102 MMOL/L — SIGNIFICANT CHANGE UP (ref 96–108)
CHLORIDE SERPL-SCNC: 103 MMOL/L — SIGNIFICANT CHANGE UP (ref 96–108)
CHOLEST SERPL-MCNC: 142 MG/DL — SIGNIFICANT CHANGE UP
CO2 SERPL-SCNC: 21 MMOL/L — LOW (ref 22–31)
CO2 SERPL-SCNC: 27 MMOL/L — SIGNIFICANT CHANGE UP (ref 22–31)
COLOR SPEC: YELLOW — SIGNIFICANT CHANGE UP
CREAT SERPL-MCNC: 2.15 MG/DL — HIGH (ref 0.5–1.3)
CREAT SERPL-MCNC: 2.17 MG/DL — HIGH (ref 0.5–1.3)
DIFF PNL FLD: NEGATIVE — SIGNIFICANT CHANGE UP
EGFR: 21 ML/MIN/1.73M2 — LOW
EGFR: 21 ML/MIN/1.73M2 — LOW
ESTIMATED AVERAGE GLUCOSE: 143 MG/DL — HIGH (ref 68–114)
FINE GRAN CASTS #/AREA URNS AUTO: PRESENT
GAS PNL BLDA: SIGNIFICANT CHANGE UP
GAS PNL BLDV: SIGNIFICANT CHANGE UP
GLUCOSE SERPL-MCNC: 120 MG/DL — HIGH (ref 70–99)
GLUCOSE SERPL-MCNC: 160 MG/DL — HIGH (ref 70–99)
GLUCOSE UR QL: NEGATIVE MG/DL — SIGNIFICANT CHANGE UP
HCT VFR BLD CALC: 26.8 % — LOW (ref 34.5–45)
HCT VFR BLD CALC: 29.6 % — LOW (ref 34.5–45)
HDLC SERPL-MCNC: 63 MG/DL — SIGNIFICANT CHANGE UP
HGB BLD-MCNC: 8.5 G/DL — LOW (ref 11.5–15.5)
HGB BLD-MCNC: 9.2 G/DL — LOW (ref 11.5–15.5)
HYALINE CASTS # UR AUTO: PRESENT
INR BLD: 1.7 RATIO — HIGH (ref 0.85–1.18)
KETONES UR-MCNC: NEGATIVE MG/DL — SIGNIFICANT CHANGE UP
LEUKOCYTE ESTERASE UR-ACNC: NEGATIVE — SIGNIFICANT CHANGE UP
LIPID PNL WITH DIRECT LDL SERPL: 60 MG/DL — SIGNIFICANT CHANGE UP
MAGNESIUM SERPL-MCNC: 2.5 MG/DL — SIGNIFICANT CHANGE UP (ref 1.6–2.6)
MAGNESIUM SERPL-MCNC: 2.5 MG/DL — SIGNIFICANT CHANGE UP (ref 1.6–2.6)
MCHC RBC-ENTMCNC: 28.6 PG — SIGNIFICANT CHANGE UP (ref 27–34)
MCHC RBC-ENTMCNC: 29.1 PG — SIGNIFICANT CHANGE UP (ref 27–34)
MCHC RBC-ENTMCNC: 31.1 GM/DL — LOW (ref 32–36)
MCHC RBC-ENTMCNC: 31.7 GM/DL — LOW (ref 32–36)
MCV RBC AUTO: 91.8 FL — SIGNIFICANT CHANGE UP (ref 80–100)
MCV RBC AUTO: 91.9 FL — SIGNIFICANT CHANGE UP (ref 80–100)
NITRITE UR-MCNC: NEGATIVE — SIGNIFICANT CHANGE UP
NON HDL CHOLESTEROL: 79 MG/DL — SIGNIFICANT CHANGE UP
NRBC # BLD: 0 /100 WBCS — SIGNIFICANT CHANGE UP (ref 0–0)
NRBC # BLD: 0 /100 WBCS — SIGNIFICANT CHANGE UP (ref 0–0)
PH UR: 5 — SIGNIFICANT CHANGE UP (ref 5–8)
PHOSPHATE SERPL-MCNC: 6 MG/DL — HIGH (ref 2.5–4.5)
PLATELET # BLD AUTO: 309 K/UL — SIGNIFICANT CHANGE UP (ref 150–400)
PLATELET # BLD AUTO: 350 K/UL — SIGNIFICANT CHANGE UP (ref 150–400)
POTASSIUM SERPL-MCNC: 4.9 MMOL/L — SIGNIFICANT CHANGE UP (ref 3.5–5.3)
POTASSIUM SERPL-MCNC: 5 MMOL/L — SIGNIFICANT CHANGE UP (ref 3.5–5.3)
POTASSIUM SERPL-SCNC: 4.9 MMOL/L — SIGNIFICANT CHANGE UP (ref 3.5–5.3)
POTASSIUM SERPL-SCNC: 5 MMOL/L — SIGNIFICANT CHANGE UP (ref 3.5–5.3)
PROT SERPL-MCNC: 5.9 G/DL — LOW (ref 6–8.3)
PROT UR-MCNC: 100 MG/DL
PROTHROM AB SERPL-ACNC: 18.4 SEC — HIGH (ref 9.5–13)
RBC # BLD: 2.92 M/UL — LOW (ref 3.8–5.2)
RBC # BLD: 3.22 M/UL — LOW (ref 3.8–5.2)
RBC # FLD: 15.8 % — HIGH (ref 10.3–14.5)
RBC # FLD: 15.9 % — HIGH (ref 10.3–14.5)
RBC CASTS # UR COMP ASSIST: 1 /HPF — SIGNIFICANT CHANGE UP (ref 0–4)
REVIEW: SIGNIFICANT CHANGE UP
SODIUM SERPL-SCNC: 138 MMOL/L — SIGNIFICANT CHANGE UP (ref 135–145)
SODIUM SERPL-SCNC: 140 MMOL/L — SIGNIFICANT CHANGE UP (ref 135–145)
SP GR SPEC: 1.01 — SIGNIFICANT CHANGE UP (ref 1–1.03)
SQUAMOUS # UR AUTO: 2 /HPF — SIGNIFICANT CHANGE UP (ref 0–5)
TRIGL SERPL-MCNC: 104 MG/DL — SIGNIFICANT CHANGE UP
TSH SERPL-MCNC: 2.19 UIU/ML — SIGNIFICANT CHANGE UP (ref 0.27–4.2)
UROBILINOGEN FLD QL: 0.2 MG/DL — SIGNIFICANT CHANGE UP (ref 0.2–1)
WBC # BLD: 13.4 K/UL — HIGH (ref 3.8–10.5)
WBC # BLD: 15.56 K/UL — HIGH (ref 3.8–10.5)
WBC # FLD AUTO: 13.4 K/UL — HIGH (ref 3.8–10.5)
WBC # FLD AUTO: 15.56 K/UL — HIGH (ref 3.8–10.5)
WBC UR QL: 1 /HPF — SIGNIFICANT CHANGE UP (ref 0–5)

## 2024-03-19 PROCEDURE — 71045 X-RAY EXAM CHEST 1 VIEW: CPT | Mod: 26

## 2024-03-19 PROCEDURE — 93010 ELECTROCARDIOGRAM REPORT: CPT

## 2024-03-19 PROCEDURE — 93306 TTE W/DOPPLER COMPLETE: CPT | Mod: 26

## 2024-03-19 PROCEDURE — 99223 1ST HOSP IP/OBS HIGH 75: CPT

## 2024-03-19 PROCEDURE — 99291 CRITICAL CARE FIRST HOUR: CPT

## 2024-03-19 RX ORDER — BUMETANIDE 0.25 MG/ML
1 INJECTION INTRAMUSCULAR; INTRAVENOUS ONCE
Refills: 0 | Status: DISCONTINUED | OUTPATIENT
Start: 2024-03-19 | End: 2024-03-19

## 2024-03-19 RX ORDER — DIAZEPAM 5 MG
5 TABLET ORAL ONCE
Refills: 0 | Status: DISCONTINUED | OUTPATIENT
Start: 2024-03-19 | End: 2024-03-19

## 2024-03-19 RX ORDER — HALOPERIDOL DECANOATE 100 MG/ML
5 INJECTION INTRAMUSCULAR ONCE
Refills: 0 | Status: COMPLETED | OUTPATIENT
Start: 2024-03-19 | End: 2024-03-19

## 2024-03-19 RX ORDER — CEFAZOLIN SODIUM 1 G
2000 VIAL (EA) INJECTION ONCE
Refills: 0 | Status: COMPLETED | OUTPATIENT
Start: 2024-03-19 | End: 2024-03-19

## 2024-03-19 RX ORDER — BUMETANIDE 0.25 MG/ML
1 INJECTION INTRAMUSCULAR; INTRAVENOUS ONCE
Refills: 0 | Status: COMPLETED | OUTPATIENT
Start: 2024-03-19 | End: 2024-03-19

## 2024-03-19 RX ORDER — BUMETANIDE 0.25 MG/ML
2 INJECTION INTRAMUSCULAR; INTRAVENOUS ONCE
Refills: 0 | Status: COMPLETED | OUTPATIENT
Start: 2024-03-19 | End: 2024-03-19

## 2024-03-19 RX ORDER — BUMETANIDE 0.25 MG/ML
2 INJECTION INTRAMUSCULAR; INTRAVENOUS EVERY 12 HOURS
Refills: 0 | Status: DISCONTINUED | OUTPATIENT
Start: 2024-03-19 | End: 2024-03-19

## 2024-03-19 RX ORDER — CEFAZOLIN SODIUM 1 G
2000 VIAL (EA) INJECTION EVERY 8 HOURS
Refills: 0 | Status: DISCONTINUED | OUTPATIENT
Start: 2024-03-19 | End: 2024-03-19

## 2024-03-19 RX ORDER — DIAZEPAM 5 MG
1 TABLET ORAL ONCE
Refills: 0 | Status: DISCONTINUED | OUTPATIENT
Start: 2024-03-19 | End: 2024-03-19

## 2024-03-19 RX ORDER — BUMETANIDE 0.25 MG/ML
2 INJECTION INTRAMUSCULAR; INTRAVENOUS EVERY 12 HOURS
Refills: 0 | Status: COMPLETED | OUTPATIENT
Start: 2024-03-19 | End: 2024-03-20

## 2024-03-19 RX ORDER — CEFAZOLIN SODIUM 1 G
VIAL (EA) INJECTION
Refills: 0 | Status: DISCONTINUED | OUTPATIENT
Start: 2024-03-19 | End: 2024-03-19

## 2024-03-19 RX ORDER — SIMVASTATIN 20 MG/1
10 TABLET, FILM COATED ORAL AT BEDTIME
Refills: 0 | Status: DISCONTINUED | OUTPATIENT
Start: 2024-03-19 | End: 2024-03-22

## 2024-03-19 RX ORDER — AMLODIPINE BESYLATE 2.5 MG/1
10 TABLET ORAL DAILY
Refills: 0 | Status: DISCONTINUED | OUTPATIENT
Start: 2024-03-19 | End: 2024-03-22

## 2024-03-19 RX ORDER — HEPARIN SODIUM 5000 [USP'U]/ML
1200 INJECTION INTRAVENOUS; SUBCUTANEOUS
Qty: 25000 | Refills: 0 | Status: DISCONTINUED | OUTPATIENT
Start: 2024-03-19 | End: 2024-03-19

## 2024-03-19 RX ADMIN — Medication 5 MILLIGRAM(S): at 03:55

## 2024-03-19 RX ADMIN — Medication 0.1 MILLIGRAM(S): at 17:16

## 2024-03-19 RX ADMIN — BUMETANIDE 1 MILLIGRAM(S): 0.25 INJECTION INTRAMUSCULAR; INTRAVENOUS at 06:33

## 2024-03-19 RX ADMIN — Medication 0.1 MILLIGRAM(S): at 21:56

## 2024-03-19 RX ADMIN — SIMVASTATIN 10 MILLIGRAM(S): 20 TABLET, FILM COATED ORAL at 21:56

## 2024-03-19 RX ADMIN — BUMETANIDE 2 MILLIGRAM(S): 0.25 INJECTION INTRAMUSCULAR; INTRAVENOUS at 11:11

## 2024-03-19 RX ADMIN — HALOPERIDOL DECANOATE 5 MILLIGRAM(S): 100 INJECTION INTRAMUSCULAR at 03:45

## 2024-03-19 RX ADMIN — Medication 2 GRAM(S): at 00:32

## 2024-03-19 RX ADMIN — CHLORHEXIDINE GLUCONATE 1 APPLICATION(S): 213 SOLUTION TOPICAL at 21:56

## 2024-03-19 RX ADMIN — HEPARIN SODIUM 12 UNIT(S)/HR: 5000 INJECTION INTRAVENOUS; SUBCUTANEOUS at 00:45

## 2024-03-19 RX ADMIN — BUMETANIDE 2 MILLIGRAM(S): 0.25 INJECTION INTRAMUSCULAR; INTRAVENOUS at 18:22

## 2024-03-19 NOTE — PROGRESS NOTE ADULT - ASSESSMENT
89F with HTN, afib on coumadin, prior CVA in 2012, presented to ED from her Cardiologist office with hypoxia and bradycardia, now s/p PPM 3/19 for CHB, still with volume overload requiring supplemental O2.

## 2024-03-19 NOTE — PROGRESS NOTE ADULT - ASSESSMENT
89 year old female with PMH of HTN, prior CVA, and COPD presented to the ED from her Cardiologist office with hypoxia and bradycardia.  As per daughter, pt has been having low HRs in 30-40 was taken of Toprol, she had been refusing PPM in the past.  She has been having worsening SOB recently, was treated for pneumonia with Augmentin.  EKG showing HR in 25-30s, junctional stefan with slow ventricular response and LBBB.  Admitted to [facility] for further management.  Pt is refusing TVP placement at this time.      #Neuro   A+Ox3   -no focal deficits     #Pulm   Hx COPD  -currently on NC, saturating >90%   -nebulizer q6hrs for wheezing   -CT chest completed, CXR Trace bilateral pleural effusions  -s/p Lasix 40 IVP, Bumex 1 IVP    #Cardiac   Bradycardia   -Rates between 25-40  -Refusing TVP at this time, transcutaneous pads applied   -avoid AV lexii blockers at this time   -TTE in the AM   -NPO for possible PPM in the AM    Afib   -home warfarin, INR 1.33  -start heparin gtt per protocol  -holding rate controlled     #Renal   DARREL  -Unknown baseline, SCr 1.92 on admission  -Continue to trend BUN/SCr  -avoid nephrotoxic meds, renally dose   -supplement electrolyte as tolerated, keep K>4.0 Mg >2.0    #GI  NPO post midnight   -no active issues     #Heme   stable H/H   -no active issues     #ID   leukocytosis   -in setting of bradycardia   -afebrile  -blood cultures x2 sets sent  -Monitor off antibiotics     #Endo  -f/u hgb A1c on admission    89 year old female with PMH of HTN, prior CVA, and COPD presented to the ED from her Cardiologist office with hypoxia and bradycardia.  As per daughter, pt has been having low HRs in 30-40 was taken of Toprol, she had been refusing PPM in the past.  She has been having worsening SOB recently, was treated for pneumonia with Augmentin.  EKG showing HR in 25-30s, junctional stefan with slow ventricular response and LBBB.  Admitted to [facility] for further management.  Pt is refusing TVP placement at this time.      #Neuro   A+Ox3   -no focal deficits     #Pulm   Hx COPD  -currently on NC, saturating >90%   -nebulizer q6hrs for wheezing   -CT chest completed, CXR Trace bilateral pleural effusions  -s/p Lasix 40 IVP, Bumex 1 IVP  - Will diurese more with Bumex 2mg IVP    #Cardiac   Bradycardia   -Rates between 25-40  -Refusing TVP at this time, transcutaneous pads applied   -avoid AV lexii blockers at this time   -TTE in the AM   -NPO for possible PPM in the AM    Afib   -home warfarin, INR 1.33  -start heparin gtt per protocol (held for procedure)  -holding rate controlled     #Renal   DARREL  -Unknown baseline, SCr 1.92 on admission  -Continue to trend BUN/SCr with diuresis   -avoid nephrotoxic meds, renally dose   -supplement electrolyte as tolerated, keep K>4.0 Mg >2.0    #GI  NPO post midnight   -no active issues     #Heme   stable H/H   -no active issues     #ID   leukocytosis   -in setting of bradycardia   -afebrile  -blood cultures x2 sets sent  -Monitor off antibiotics     #Endo  -f/u hgb A1c on admission

## 2024-03-19 NOTE — PROGRESS NOTE ADULT - ATTENDING COMMENTS
88 yo woman with bradycardia, transferred to ICU for further management     A+Ox2-3  Hemodynamics acceptable, no pressors or inotropes  s/p PPM today   O2 sats mid to high 90s on NRB, wean to face mask as tolerated likely due to CHF due to significant stefan   npo for device, then restart po intake   elevated creat, unknown baseline, cont diuresis   H/H low but acceptable  DVT ppx on hold given need for device   Afebrile, no antibiotics  Sugars controlled  No central access, pt refused TVP

## 2024-03-19 NOTE — PROGRESS NOTE ADULT - NSPROGADDITIONALINFOA_GEN_ALL_CORE
The necessity of the time spent during the encounter on this date of service was due to:   - Ordering, reviewing, and interpreting labs, testing, and imaging  - Independently obtaining a review of systems and performing a physical exam  - Reviewing prior hospitalization and where necessary, outpatient records  - Reviewing consultant recommendations/communicating with consultants  - Counselling and educating patient and family regarding interpretation of aforementioned items and plan of care    Time-based billing (NON-critical care). Total minutes spent: 90

## 2024-03-19 NOTE — PROGRESS NOTE ADULT - SUBJECTIVE AND OBJECTIVE BOX
Trae Bryant MD  [facility] Division of Hospital Medicine  Available via MS Teams      HPI:  89 year old female with PMH of HTN, afib, prior CVA (2012) presented to the ED from her Cardiologist office with hypoxia and bradycardia.  As per daughter, pt has been having low HRs in 30-40 was taken of Toprol, she had been refusing PPM in the past.  She has been having worsening SOB recently, was treated for pneumonia with Augmentin.  EKG showing HR in 25-30s, junctional stefan with slow ventricular response and LBBB.  Admitted to [facility] for further management.  (18 Mar 2024 23:28)    Interval events: S/p medtronic single lead PPM in left upper chest placed 3/19. Pt tolerated procedure well. Still on venturi. Pt states her breathing is improved. Denies any pain.     PAST MEDICAL & SURGICAL HISTORY:  Stroke  HTN (hypertension)  Afib  Allergies    No Known Allergies  Intolerances    Social History:     FAMILY HISTORY:      MEDICATIONS  (STANDING):  amLODIPine   Tablet 10 milliGRAM(s) Oral daily  buMETAnide IVPB 2 milliGRAM(s) IV Intermittent every 12 hours  chlorhexidine 2% Cloths 1 Application(s) Topical at bedtime  cloNIDine 0.1 milliGRAM(s) Oral three times a day  simvastatin 10 milliGRAM(s) Oral at bedtime    MEDICATIONS  (PRN):    Home Medications:  amLODIPine 10 mg oral tablet: 1 tab(s) orally once a day (18 Mar 2024 23:39)  cloNIDine 0.1 mg oral tablet: 1 tab(s) orally 3 times a day (18 Mar 2024 23:40)  furosemide 20 mg oral tablet: 1 tab(s) orally once a day (18 Mar 2024 23:40)  Proventil 90 mcg/inh inhalation aerosol: inhaled prn (18 Mar 2024 23:41)  simvastatin 10 mg oral tablet: 1 tab(s) orally once a day (18 Mar 2024 23:40)  warfarin 2.5 mg oral tablet: 1 tab(s) orally once a day (18 Mar 2024 23:39)      CAPILLARY BLOOD GLUCOSE    I&O's Summary    18 Mar 2024 07:01  -  19 Mar 2024 07:00  --------------------------------------------------------  IN: 84 mL / OUT: 575 mL / NET: -491 mL    19 Mar 2024 07:01  -  19 Mar 2024 17:25  --------------------------------------------------------  IN: 12 mL / OUT: 700 mL / NET: -688 mL        Physical Exam:  Vital Signs Last 24 Hrs  T(C): 36.5 (19 Mar 2024 16:00), Max: 36.5 (19 Mar 2024 16:00)  T(F): 97.7 (19 Mar 2024 16:00), Max: 97.7 (19 Mar 2024 16:00)  HR: 50 (19 Mar 2024 16:00) (23 - 59)  BP: 145/64 (19 Mar 2024 16:00) (133/63 - 216/91)  BP(mean): 92 (19 Mar 2024 16:00) (76 - 131)  RR: 25 (19 Mar 2024 16:00) (12 - 50)  SpO2: 90% (19 Mar 2024 16:00) (77% - 100%)    Parameters below as of 19 Mar 2024 16:00  Patient On (Oxygen Delivery Method): mask, Venturi    O2 Concentration (%): 35    CONSTITUTIONAL: NAD, elderly female w/ venturi mask  NECK: Supple, no palpable masses; no thyromegaly  RESPIRATORY: crackles up to mid posterior back, no wheezing  CARDIOVASCULAR: normal S1 and S2, no murmur/rub/gallop; 3+ b/l pitting edema. left upper chest wall post surgical changes  ABDOMEN: soft, nt, nd   PSYCH: A+O to person, place, and time; affect appropriate  SKIN: No rashes; no palpable lesions    LABS:                        8.5    13.40 )-----------( 309      ( 19 Mar 2024 07:30 )             26.8     03-19    138  |  103  |  45<H>  ----------------------------<  160<H>  5.0   |  21<L>  |  2.15<H>    Ca    9.1      19 Mar 2024 07:30  Phos  6.0     03-19  Mg     2.5     03-19    TPro  5.9<L>  /  Alb  3.4  /  TBili  0.3  /  DBili  x   /  AST  20  /  ALT  20  /  AlkPhos  75  03-19    PT/INR - ( 19 Mar 2024 07:30 )   PT: 15.3 sec;   INR: 1.40 ratio         PTT - ( 19 Mar 2024 07:30 )  PTT:32.3 sec  CARDIAC MARKERS ( 18 Mar 2024 21:24 )  x     / x     / 56 U/L / x     / 5.4 ng/mL  CARDIAC MARKERS ( 18 Mar 2024 19:05 )  x     / x     / 63 U/L / x     / 5.5 ng/mL      Urinalysis Basic - ( 19 Mar 2024 07:30 )    Color: x / Appearance: x / SG: x / pH: x  Gluc: 160 mg/dL / Ketone: x  / Bili: x / Urobili: x   Blood: x / Protein: x / Nitrite: x   Leuk Esterase: x / RBC: x / WBC x   Sq Epi: x / Non Sq Epi: x / Bacteria: x          RADIOLOGY & ADDITIONAL TESTS:  Results Reviewed:   Imaging Personally Reviewed:  Electrocardiogram Personally Reviewed:    COORDINATION OF CARE:  Care Discussed with Consultants/Other Providers [Y/N]:  Prior or Outpatient Records Reviewed [Y/N]:

## 2024-03-19 NOTE — PRE-ANESTHESIA EVALUATION ADULT - NSANTHVSUPDATERD_GEN_ALL_CORE
[place] Website --- [email or web address] website --- [email or web address] <<------Select to update Vital Signs

## 2024-03-19 NOTE — CHART NOTE - NSCHARTNOTEFT_GEN_A_CORE
Transfer Note    Transfer from: CICU    Transfer to: (  ) Medicine    ( X ) Telemetry     (   ) RCU        (    ) Palliative         (   ) Stroke Unit          (   ) __________________    Accepting Physician: Dr. Bolanos  Signout given to:     CICU COURSE:  Pt was initially admitted to the CICU from the ED for HR in the 20s to mid 30s. Pt was initially offered TVP however she refused. Overnight (3/18-3/19) Pt became agitated overnight when she tried to go to the bathroom but was un able to secondary to being a fall risk. She was given 5 of IV haldol and 5 of valium. The morning of 3/19, she continued to have an HR between mid 20s- low 30s but her mentation has been unchanged and MAP in the 90s. She had a pacemaker placed on 3/19 and now has a HR in the high 50s.         ASSESSMENT & PLAN:     89 year old female with PMH of HTN, prior CVA, and COPD presented to the ED from her Cardiologist office with hypoxia and bradycardia.  As per daughter, pt has been having low HRs in 30-40 was taken of Toprol, she had been refusing PPM in the past.  She has been having worsening SOB recently, was treated for pneumonia with Augmentin.  EKG showing HR in 25-30s, junctional stefan with slow ventricular response and LBBB.  Admitted to [facility] for further management.  Pt is refusing TVP placement at this time.      #Neuro   A+Ox3   -no focal deficits     #Pulm   Hx COPD  -currently on NC, saturating >90%   -nebulizer q6hrs for wheezing   -CT chest completed, CXR Trace bilateral pleural effusions  -s/p Lasix 40 IVP, Bumex 1 IVP  - Will diurese more with Bumex 2mg IVP    #Cardiac   Bradycardia   - s/p pacemaker placement on 3/19      Afib   -home warfarin, INR 1.33  -start heparin gtt per protocol (held for procedure)  -holding rate controlled     #Renal   DARREL  -Unknown baseline, SCr 1.92 on admission  -Continue to trend BUN/SCr with diuresis   -avoid nephrotoxic meds, renally dose   -supplement electrolyte as tolerated, keep K>4.0 Mg >2.0    #GI  NPO post midnight   -no active issues     #Heme   stable H/H   -no active issues     #ID   leukocytosis   -in setting of bradycardia   -afebrile  -blood cultures x2 sets sent  -Monitor off antibiotics     #Endo  -f/u hgb A1c on admission

## 2024-03-19 NOTE — PROGRESS NOTE ADULT - PROBLEM SELECTOR PLAN 8
DVT: hold for PPM  Diet: regular  Dispo: PT consult when more euvolemic ?post procedural  - monitor hgb  - maintain active T&S  - f/u iron studies

## 2024-03-19 NOTE — PROGRESS NOTE ADULT - SUBJECTIVE AND OBJECTIVE BOX
PATIENT:  GUSTAVO VARGAS  [id number]    CHIEF COMPLAINT:  Patient is a 89y old  Female who presents with a chief complaint of Bradycardia   Overnight Pt started to feel better with improving in SOB. She became agitated after she was unable to et out of bed to urinate. She required 5 of IV haldol and 5 of Valium.       INTERVAL HISTORY/OVERNIGHT EVENTS:  The patient was seen and examined at bedside.     REVIEW OF SYSTEMS:    Constitutional:     [ ] negative [ ] fevers [ ] chills [ ] weight loss [ ] weight gain  HEENT:                  [ ] negative [ ] dry eyes [ ] eye irritation [ ] postnasal drip [ ] nasal congestion  CV:                         [ ] negative  [ ] chest pain [ ] orthopnea [ ] palpitations [ ] murmur  Resp:                     [ ] negative [ ] cough [ ] shortness of breath [ ] dyspnea [ ] wheezing [ ] sputum [ ] hemoptysis  GI:                          [ ] negative [ ] nausea [ ] vomiting [ ] diarrhea [ ] constipation [ ] abd pain [ ] dysphagia   :                        [ ] negative [ ] dysuria [ ] nocturia [ ] hematuria [ ] increased urinary frequency  Musculoskeletal: [ ] negative [ ] back pain [ ] myalgias [ ] arthralgias [ ] fracture  Skin:                       [ ] negative [ ] rash [ ] itch  Neurological:        [ ] negative [ ] headache [ ] dizziness [ ] syncope [ ] weakness [ ] numbness  Psychiatric:           [ ] negative [ ] anxiety [ ] depression  Endocrine:            [ ] negative [ ] diabetes [ ] thyroid problem  Heme/Lymph:      [ ] negative [ ] anemia [ ] bleeding problem  Allergic/Immune: [ ] negative [ ] itchy eyes [ ] nasal discharge [ ] hives [ ] angioedema    [ ] All other systems negative  [ ] Unable to assess ROS because ________.    MEDICATIONS:  MEDICATIONS  (STANDING):  chlorhexidine 2% Cloths 1 Application(s) Topical at bedtime  cloNIDine 0.1 milliGRAM(s) Oral two times a day  simvastatin 10 milliGRAM(s) Oral at bedtime    MEDICATIONS  (PRN):      ALLERGIES:  Allergies    No Known Allergies    Intolerances        OBJECTIVE:  ICU Vital Signs Last 24 Hrs  T(C): 36.4 (19 Mar 2024 08:00), Max: 36.4 (18 Mar 2024 21:27)  T(F): 97.5 (19 Mar 2024 08:00), Max: 97.5 (18 Mar 2024 21:27)  HR: 26 (19 Mar 2024 08:00) (23 - 58)  BP: 150/60 (19 Mar 2024 08:00) (133/63 - 216/91)  BP(mean): 86 (19 Mar 2024 08:00) (76 - 131)  ABP: --  ABP(mean): --  RR: 12 (19 Mar 2024 08:00) (12 - 50)  SpO2: 96% (19 Mar 2024 08:00) (77% - 99%)    O2 Parameters below as of 19 Mar 2024 08:00  Patient On (Oxygen Delivery Method): mask, nonrebreather            Adult Advanced Hemodynamics Last 24 Hrs  CVP(mm Hg): --  CVP(cm H2O): --  CO: --  CI: --  PA: --  PA(mean): --  PCWP: --  SVR: --  SVRI: --  PVR: --  PVRI: --  CAPILLARY BLOOD GLUCOSE        CAPILLARY BLOOD GLUCOSE        I&O's Summary    18 Mar 2024 07:01  -  19 Mar 2024 07:00  --------------------------------------------------------  IN: 84 mL / OUT: 575 mL / NET: -491 mL    19 Mar 2024 07:01  -  19 Mar 2024 09:10  --------------------------------------------------------  IN: 12 mL / OUT: 55 mL / NET: -43 mL      Daily Height in cm: 162.56 (18 Mar 2024 23:34)    Daily     PHYSICAL EXAMINATION:  General: WN/WD NAD  HEENT: moist mucous membranes  Neurology: A&Ox3, nonfocal, SCOTT x 4  Respiratory: +decreased breath sounds in bases  CV: + bradycardia to low 20s  Abdominal: Soft, NT, ND +BS  Extremities: No edema, + peripheral pulses  Incisions:   Tubes:    LABS:  ABG - ( 19 Mar 2024 07:17 )  pH, Arterial: 7.30  pH, Blood: x     /  pCO2: 52    /  pO2: 86    / HCO3: 26    / Base Excess: -1.1  /  SaO2: 97.8                                    8.5    13.40 )-----------( 309      ( 19 Mar 2024 07:30 )             26.8     03-19    138  |  103  |  45<H>  ----------------------------<  160<H>  5.0   |  21<L>  |  2.15<H>    Ca    9.1      19 Mar 2024 07:30  Phos  6.0     03-19  Mg     2.5     03-19    TPro  5.9<L>  /  Alb  3.4  /  TBili  0.3  /  DBili  x   /  AST  20  /  ALT  20  /  AlkPhos  75  03-19    LIVER FUNCTIONS - ( 19 Mar 2024 07:30 )  Alb: 3.4 g/dL / Pro: 5.9 g/dL / ALK PHOS: 75 U/L / ALT: 20 U/L / AST: 20 U/L / GGT: x           PT/INR - ( 18 Mar 2024 19:32 )   PT: 13.8 sec;   INR: 1.33 ratio         PTT - ( 19 Mar 2024 07:30 )  PTT:32.3 sec  Creatine Kinase, Serum: 56 U/L (03-18 @ 21:24)  CKMB Units: 5.4 ng/mL (03-18 @ 21:24)  Creatine Kinase, Serum: 63 U/L (03-18 @ 19:05)  CKMB Units: 5.5 ng/mL (03-18 @ 19:05)    CARDIAC MARKERS ( 18 Mar 2024 21:24 )  x     / x     / 56 U/L / x     / 5.4 ng/mL  CARDIAC MARKERS ( 18 Mar 2024 19:05 )  x     / x     / 63 U/L / x     / 5.5 ng/mL      Urinalysis Basic - ( 19 Mar 2024 07:30 )    Color: x / Appearance: x / SG: x / pH: x  Gluc: 160 mg/dL / Ketone: x  / Bili: x / Urobili: x   Blood: x / Protein: x / Nitrite: x   Leuk Esterase: x / RBC: x / WBC x   Sq Epi: x / Non Sq Epi: x / Bacteria: x        TELEMETRY:     EKG:     IMAGING:       PATIENT:  GUSTAVO VARGAS  [id number]    CHIEF COMPLAINT:  Patient is a 89y old  Female who presents with a chief complaint of Bradycardia   Overnight Pt started to feel better with improving in SOB. She became agitated after she was unable to et out of bed to urinate. She required 5 of IV haldol and 5 of Valium.       INTERVAL HISTORY/OVERNIGHT EVENTS:  The patient was seen and examined at bedside.     REVIEW OF SYSTEMS:    Constitutional:     [ ] negative [ ] fevers [ ] chills [ ] weight loss [ ] weight gain  HEENT:                  [ ] negative [ ] dry eyes [ ] eye irritation [ ] postnasal drip [ ] nasal congestion  CV:                         [ ] negative  [ ] chest pain [ ] orthopnea [ ] palpitations [ ] murmur  Resp:                     [ ] negative [ ] cough [ ] shortness of breath [ ] dyspnea [ ] wheezing [ ] sputum [ ] hemoptysis  GI:                          [ ] negative [ ] nausea [ ] vomiting [ ] diarrhea [ ] constipation [ ] abd pain [ ] dysphagia   :                        [ ] negative [ ] dysuria [ ] nocturia [ ] hematuria [ ] increased urinary frequency  Musculoskeletal: [ ] negative [ ] back pain [ ] myalgias [ ] arthralgias [ ] fracture  Skin:                       [ ] negative [ ] rash [ ] itch  Neurological:        [ ] negative [ ] headache [ ] dizziness [ ] syncope [ ] weakness [ ] numbness  Psychiatric:           [ ] negative [ ] anxiety [ ] depression  Endocrine:            [ ] negative [ ] diabetes [ ] thyroid problem  Heme/Lymph:      [ ] negative [ ] anemia [ ] bleeding problem  Allergic/Immune: [ ] negative [ ] itchy eyes [ ] nasal discharge [ ] hives [ ] angioedema    [ ] All other systems negative  [ ] Unable to assess ROS because ________.    MEDICATIONS:  MEDICATIONS  (STANDING):  chlorhexidine 2% Cloths 1 Application(s) Topical at bedtime  cloNIDine 0.1 milliGRAM(s) Oral two times a day  simvastatin 10 milliGRAM(s) Oral at bedtime    MEDICATIONS  (PRN):      ALLERGIES:  Allergies    No Known Allergies    Intolerances        OBJECTIVE:  ICU Vital Signs Last 24 Hrs  T(C): 36.4 (19 Mar 2024 08:00), Max: 36.4 (18 Mar 2024 21:27)  T(F): 97.5 (19 Mar 2024 08:00), Max: 97.5 (18 Mar 2024 21:27)  HR: 26 (19 Mar 2024 08:00) (23 - 58)  BP: 150/60 (19 Mar 2024 08:00) (133/63 - 216/91)  BP(mean): 86 (19 Mar 2024 08:00) (76 - 131)  ABP: --  ABP(mean): --  RR: 12 (19 Mar 2024 08:00) (12 - 50)  SpO2: 96% (19 Mar 2024 08:00) (77% - 99%)    O2 Parameters below as of 19 Mar 2024 08:00  Patient On (Oxygen Delivery Method): mask, nonrebreather            Adult Advanced Hemodynamics Last 24 Hrs  CVP(mm Hg): --  CVP(cm H2O): --  CO: --  CI: --  PA: --  PA(mean): --  PCWP: --  SVR: --  SVRI: --  PVR: --  PVRI: --  CAPILLARY BLOOD GLUCOSE        CAPILLARY BLOOD GLUCOSE        I&O's Summary    18 Mar 2024 07:01  -  19 Mar 2024 07:00  --------------------------------------------------------  IN: 84 mL / OUT: 575 mL / NET: -491 mL    19 Mar 2024 07:01  -  19 Mar 2024 09:10  --------------------------------------------------------  IN: 12 mL / OUT: 55 mL / NET: -43 mL      Daily Height in cm: 162.56 (18 Mar 2024 23:34)    Daily     PHYSICAL EXAMINATION:  General: WN/WD NAD  HEENT: moist mucous membranes  Neurology: A&Ox3, nonfocal, SCOTT x 4  Respiratory: +decreased breath sounds in bases  CV: + bradycardia to low 20s  Abdominal: Soft, NT, ND +BS  Extremities: No edema, + peripheral pulses  Incisions:   Tubes:    LABS:  ABG - ( 19 Mar 2024 07:17 )  pH, Arterial: 7.30  pH, Blood: x     /  pCO2: 52    /  pO2: 86    / HCO3: 26    / Base Excess: -1.1  /  SaO2: 97.8                                    8.5    13.40 )-----------( 309      ( 19 Mar 2024 07:30 )             26.8     03-19    138  |  103  |  45<H>  ----------------------------<  160<H>  5.0   |  21<L>  |  2.15<H>    Ca    9.1      19 Mar 2024 07:30  Phos  6.0     03-19  Mg     2.5     03-19    TPro  5.9<L>  /  Alb  3.4  /  TBili  0.3  /  DBili  x   /  AST  20  /  ALT  20  /  AlkPhos  75  03-19    LIVER FUNCTIONS - ( 19 Mar 2024 07:30 )  Alb: 3.4 g/dL / Pro: 5.9 g/dL / ALK PHOS: 75 U/L / ALT: 20 U/L / AST: 20 U/L / GGT: x           PT/INR - ( 18 Mar 2024 19:32 )   PT: 13.8 sec;   INR: 1.33 ratio         PTT - ( 19 Mar 2024 07:30 )  PTT:32.3 sec  Creatine Kinase, Serum: 56 U/L (03-18 @ 21:24)  CKMB Units: 5.4 ng/mL (03-18 @ 21:24)  Creatine Kinase, Serum: 63 U/L (03-18 @ 19:05)  CKMB Units: 5.5 ng/mL (03-18 @ 19:05)    CARDIAC MARKERS ( 18 Mar 2024 21:24 )  x     / x     / 56 U/L / x     / 5.4 ng/mL  CARDIAC MARKERS ( 18 Mar 2024 19:05 )  x     / x     / 63 U/L / x     / 5.5 ng/mL      Urinalysis Basic - ( 19 Mar 2024 07:30 )    Color: x / Appearance: x / SG: x / pH: x  Gluc: 160 mg/dL / Ketone: x  / Bili: x / Urobili: x   Blood: x / Protein: x / Nitrite: x   Leuk Esterase: x / RBC: x / WBC x   Sq Epi: x / Non Sq Epi: x / Bacteria: x       PATIENT:  GUSTAVO VARGAS  [id number]    CHIEF COMPLAINT:  Patient is a 89y old  Female who presents with a chief complaint of Bradycardia   Overnight Pt started to feel better with improving in SOB. She became agitated after she was unable to et out of bed to urinate. She required 5 of IV haldol and 5 of Valium.       INTERVAL HISTORY/OVERNIGHT EVENTS:  The patient was seen and examined at bedside.     REVIEW OF SYSTEMS:    Constitutional:     [ ] negative [ ] fevers [ ] chills [ ] weight loss [ ] weight gain  HEENT:                  [ ] negative [ ] dry eyes [ ] eye irritation [ ] postnasal drip [ ] nasal congestion  CV:                         [ ] negative  [ ] chest pain [ ] orthopnea [ ] palpitations [ ] murmur  Resp:                     [ ] negative [ ] cough [ ] shortness of breath [ ] dyspnea [ ] wheezing [ ] sputum [ ] hemoptysis  GI:                          [ ] negative [ ] nausea [ ] vomiting [ ] diarrhea [ ] constipation [ ] abd pain [ ] dysphagia   :                        [ ] negative [ ] dysuria [ ] nocturia [ ] hematuria [ ] increased urinary frequency  Musculoskeletal: [ ] negative [ ] back pain [ ] myalgias [ ] arthralgias [ ] fracture  Skin:                       [ ] negative [ ] rash [ ] itch  Neurological:        [ ] negative [ ] headache [ ] dizziness [ ] syncope [ ] weakness [ ] numbness  Psychiatric:           [ ] negative [ ] anxiety [ ] depression  Endocrine:            [ ] negative [ ] diabetes [ ] thyroid problem  Heme/Lymph:      [ ] negative [ ] anemia [ ] bleeding problem  Allergic/Immune: [ ] negative [ ] itchy eyes [ ] nasal discharge [ ] hives [ ] angioedema    [ ] All other systems negative  [ ] Unable to assess ROS because ________.    MEDICATIONS:  MEDICATIONS  (STANDING):  chlorhexidine 2% Cloths 1 Application(s) Topical at bedtime  cloNIDine 0.1 milliGRAM(s) Oral two times a day  simvastatin 10 milliGRAM(s) Oral at bedtime    MEDICATIONS  (PRN):      ALLERGIES:  Allergies    No Known Allergies    Intolerances        OBJECTIVE:  ICU Vital Signs Last 24 Hrs  T(C): 36.4 (19 Mar 2024 08:00), Max: 36.4 (18 Mar 2024 21:27)  T(F): 97.5 (19 Mar 2024 08:00), Max: 97.5 (18 Mar 2024 21:27)  HR: 26 (19 Mar 2024 08:00) (23 - 58)  BP: 150/60 (19 Mar 2024 08:00) (133/63 - 216/91)  BP(mean): 86 (19 Mar 2024 08:00) (76 - 131)  ABP: --  ABP(mean): --  RR: 12 (19 Mar 2024 08:00) (12 - 50)  SpO2: 96% (19 Mar 2024 08:00) (77% - 99%)    O2 Parameters below as of 19 Mar 2024 08:00  Patient On (Oxygen Delivery Method): mask, nonrebreather            Adult Advanced Hemodynamics Last 24 Hrs  CVP(mm Hg): --  CVP(cm H2O): --  CO: --  CI: --  PA: --  PA(mean): --  PCWP: --  SVR: --  SVRI: --  PVR: --  PVRI: --  CAPILLARY BLOOD GLUCOSE        CAPILLARY BLOOD GLUCOSE        I&O's Summary    18 Mar 2024 07:01  -  19 Mar 2024 07:00  --------------------------------------------------------  IN: 84 mL / OUT: 575 mL / NET: -491 mL    19 Mar 2024 07:01  -  19 Mar 2024 09:10  --------------------------------------------------------  IN: 12 mL / OUT: 55 mL / NET: -43 mL      Daily Height in cm: 162.56 (18 Mar 2024 23:34)    Daily     PHYSICAL EXAMINATION:  General: elderly, frail   HEENT: moist mucous membranes  Neurology: A&Ox2-3  Respiratory: +decreased breath sounds in bases  CV: + bradycardia to low 20s  Abdominal: Soft, NT, ND +BS  Extremities: +edema, + peripheral pulses  Incisions:   Tubes:    LABS:  ABG - ( 19 Mar 2024 07:17 )  pH, Arterial: 7.30  pH, Blood: x     /  pCO2: 52    /  pO2: 86    / HCO3: 26    / Base Excess: -1.1  /  SaO2: 97.8                                    8.5    13.40 )-----------( 309      ( 19 Mar 2024 07:30 )             26.8     03-19    138  |  103  |  45<H>  ----------------------------<  160<H>  5.0   |  21<L>  |  2.15<H>    Ca    9.1      19 Mar 2024 07:30  Phos  6.0     03-19  Mg     2.5     03-19    TPro  5.9<L>  /  Alb  3.4  /  TBili  0.3  /  DBili  x   /  AST  20  /  ALT  20  /  AlkPhos  75  03-19    LIVER FUNCTIONS - ( 19 Mar 2024 07:30 )  Alb: 3.4 g/dL / Pro: 5.9 g/dL / ALK PHOS: 75 U/L / ALT: 20 U/L / AST: 20 U/L / GGT: x           PT/INR - ( 18 Mar 2024 19:32 )   PT: 13.8 sec;   INR: 1.33 ratio         PTT - ( 19 Mar 2024 07:30 )  PTT:32.3 sec  Creatine Kinase, Serum: 56 U/L (03-18 @ 21:24)  CKMB Units: 5.4 ng/mL (03-18 @ 21:24)  Creatine Kinase, Serum: 63 U/L (03-18 @ 19:05)  CKMB Units: 5.5 ng/mL (03-18 @ 19:05)    CARDIAC MARKERS ( 18 Mar 2024 21:24 )  x     / x     / 56 U/L / x     / 5.4 ng/mL  CARDIAC MARKERS ( 18 Mar 2024 19:05 )  x     / x     / 63 U/L / x     / 5.5 ng/mL      Urinalysis Basic - ( 19 Mar 2024 07:30 )    Color: x / Appearance: x / SG: x / pH: x  Gluc: 160 mg/dL / Ketone: x  / Bili: x / Urobili: x   Blood: x / Protein: x / Nitrite: x   Leuk Esterase: x / RBC: x / WBC x   Sq Epi: x / Non Sq Epi: x / Bacteria: x

## 2024-03-20 LAB
ANION GAP SERPL CALC-SCNC: 11 MMOL/L — SIGNIFICANT CHANGE UP (ref 5–17)
BUN SERPL-MCNC: 45 MG/DL — HIGH (ref 7–23)
CALCIUM SERPL-MCNC: 8.8 MG/DL — SIGNIFICANT CHANGE UP (ref 8.4–10.5)
CHLORIDE SERPL-SCNC: 103 MMOL/L — SIGNIFICANT CHANGE UP (ref 96–108)
CO2 SERPL-SCNC: 30 MMOL/L — SIGNIFICANT CHANGE UP (ref 22–31)
CREAT SERPL-MCNC: 2.13 MG/DL — HIGH (ref 0.5–1.3)
EGFR: 22 ML/MIN/1.73M2 — LOW
FERRITIN SERPL-MCNC: 89 NG/ML — SIGNIFICANT CHANGE UP (ref 13–330)
GLUCOSE SERPL-MCNC: 94 MG/DL — SIGNIFICANT CHANGE UP (ref 70–99)
HCT VFR BLD CALC: 28.1 % — LOW (ref 34.5–45)
HGB BLD-MCNC: 8.7 G/DL — LOW (ref 11.5–15.5)
INR BLD: 2.08 RATIO — HIGH (ref 0.85–1.18)
IRON SATN MFR SERPL: 20 UG/DL — LOW (ref 30–160)
IRON SATN MFR SERPL: 8 % — LOW (ref 14–50)
MAGNESIUM SERPL-MCNC: 2.4 MG/DL — SIGNIFICANT CHANGE UP (ref 1.6–2.6)
MCHC RBC-ENTMCNC: 28.6 PG — SIGNIFICANT CHANGE UP (ref 27–34)
MCHC RBC-ENTMCNC: 31 GM/DL — LOW (ref 32–36)
MCV RBC AUTO: 92.4 FL — SIGNIFICANT CHANGE UP (ref 80–100)
NRBC # BLD: 0 /100 WBCS — SIGNIFICANT CHANGE UP (ref 0–0)
PHOSPHATE SERPL-MCNC: 5.3 MG/DL — HIGH (ref 2.5–4.5)
PLATELET # BLD AUTO: 312 K/UL — SIGNIFICANT CHANGE UP (ref 150–400)
POTASSIUM SERPL-MCNC: 4.4 MMOL/L — SIGNIFICANT CHANGE UP (ref 3.5–5.3)
POTASSIUM SERPL-SCNC: 4.4 MMOL/L — SIGNIFICANT CHANGE UP (ref 3.5–5.3)
PROTHROM AB SERPL-ACNC: 22.4 SEC — HIGH (ref 9.5–13)
RBC # BLD: 3.04 M/UL — LOW (ref 3.8–5.2)
RBC # FLD: 15.9 % — HIGH (ref 10.3–14.5)
SODIUM SERPL-SCNC: 144 MMOL/L — SIGNIFICANT CHANGE UP (ref 135–145)
TIBC SERPL-MCNC: 263 UG/DL — SIGNIFICANT CHANGE UP (ref 220–430)
UIBC SERPL-MCNC: 243 UG/DL — SIGNIFICANT CHANGE UP (ref 110–370)
WBC # BLD: 11.99 K/UL — HIGH (ref 3.8–10.5)
WBC # FLD AUTO: 11.99 K/UL — HIGH (ref 3.8–10.5)

## 2024-03-20 PROCEDURE — 99233 SBSQ HOSP IP/OBS HIGH 50: CPT

## 2024-03-20 RX ORDER — ALBUTEROL 90 UG/1
0 AEROSOL, METERED ORAL
Refills: 0 | DISCHARGE

## 2024-03-20 RX ORDER — WARFARIN SODIUM 2.5 MG/1
2.5 TABLET ORAL ONCE
Refills: 0 | Status: DISCONTINUED | OUTPATIENT
Start: 2024-03-20 | End: 2024-03-20

## 2024-03-20 RX ADMIN — Medication 0.1 MILLIGRAM(S): at 21:35

## 2024-03-20 RX ADMIN — BUMETANIDE 2 MILLIGRAM(S): 0.25 INJECTION INTRAMUSCULAR; INTRAVENOUS at 05:17

## 2024-03-20 RX ADMIN — BUMETANIDE 2 MILLIGRAM(S): 0.25 INJECTION INTRAMUSCULAR; INTRAVENOUS at 18:23

## 2024-03-20 RX ADMIN — Medication 0.1 MILLIGRAM(S): at 13:44

## 2024-03-20 RX ADMIN — AMLODIPINE BESYLATE 10 MILLIGRAM(S): 2.5 TABLET ORAL at 05:17

## 2024-03-20 RX ADMIN — Medication 0.1 MILLIGRAM(S): at 05:17

## 2024-03-20 RX ADMIN — CHLORHEXIDINE GLUCONATE 1 APPLICATION(S): 213 SOLUTION TOPICAL at 21:39

## 2024-03-20 RX ADMIN — SIMVASTATIN 10 MILLIGRAM(S): 20 TABLET, FILM COATED ORAL at 21:35

## 2024-03-20 NOTE — PROGRESS NOTE ADULT - PROBLEM SELECTOR PLAN 6
Prior CVA in 2012, with resultant memory issues  - not on aspirin due to being on full AC  - would suggest high intensity statin instead of simvastatin for secondary prevention but given her adequate LDL control (60) and age, will defer to outpatient neurology/PCP  - c/w simvastatin
Prior CVA in 2012, with resultant memory issues  - not on aspirin due to being on full AC  - would suggest high intensity statin instead of simvastatin for secondary prevention but given her adequate LDL control (60) and age, will defer to outpatient neurology/PCP  - c/w simvastatin

## 2024-03-20 NOTE — PHYSICAL THERAPY INITIAL EVALUATION ADULT - WEIGHT-BEARING RESTRICTIONS: SIT/STAND, REHAB EVAL
Remote ICD check   Received: Today   Message Contents   Ani Isbell, RN  Mariam Pascual, MOISES             Her home monitor updated this morning at 150am, no arrhythmias noted.  Automatic lead and battery testing WNL       
full weight-bearing

## 2024-03-20 NOTE — PHYSICAL THERAPY INITIAL EVALUATION ADULT - ADDITIONAL COMMENTS
PACU to Home
Pt lives with her daughter in a private house with no steps to enter  but then 4-5 steps with handrail to living room.

## 2024-03-20 NOTE — PHYSICAL THERAPY INITIAL EVALUATION ADULT - ACTIVE RANGE OF MOTION EXAMINATION, REHAB EVAL
L shoulder flexion kept <90d 2/2 s/p PPM/bilateral upper extremity Active ROM was WFL (within functional limits)/bilateral  lower extremity Active ROM was WFL (within functional limits)

## 2024-03-20 NOTE — PHYSICAL THERAPY INITIAL EVALUATION ADULT - PERTINENT HX OF CURRENT PROBLEM, REHAB EVAL
89 year old female with PMH of HTN, prior CVA, and COPD presented to the ED from her Cardiologist office with hypoxia and bradycardia.  As per daughter, pt has been having low HRs in 30-40 was taken of Toprol, she had been refusing PPM in the past.  She has been having worsening SOB recently, was treated for pneumonia with Augmentin.  EKG showing HR in 25-30s, junctional stefan with slow ventricular response and LBBB.  Admitted to [facility] for further management. 89 year old female with PMH of HTN, prior CVA, and COPD presented to the ED from her Cardiologist office with hypoxia and bradycardia.  As per daughter, pt has been having low HRs in 30-40 was taken of Toprol, she had been refusing PPM in the past.  She has been having worsening SOB recently, was treated for pneumonia with Augmentin.  EKG showing HR in 25-30s, junctional stefan with slow ventricular response and LBBB.  Admitted to [facility] for further management. Pt is s/p PPM 3/19 for CHB, still with volume overload requiring supplemental O2.

## 2024-03-20 NOTE — PROGRESS NOTE ADULT - PROBLEM SELECTOR PLAN 8
?post procedural, monitor for pocket hematoma  - monitor hgb  - maintain active T&S  - f/u iron studies

## 2024-03-20 NOTE — PROGRESS NOTE ADULT - PROBLEM SELECTOR PLAN 5
On clonidine 0.1 mg TID and amlodipine 10 mg daily  - c/w home meds  - monitor BP
On clonidine 0.1 mg TID and amlodipine 10 mg daily  - c/w home meds  - monitor BP

## 2024-03-20 NOTE — PROGRESS NOTE ADULT - PROBLEM SELECTOR PLAN 7
Unclear why on coumadin instead of DOAC for afib  - will defer to EP when to resume AC/which AC to resume
Unclear why on coumadin instead of DOAC for afib (no MS on echo)   - will defer to EP when to resume AC/which AC to resume

## 2024-03-20 NOTE — PROGRESS NOTE ADULT - SUBJECTIVE AND OBJECTIVE BOX
[facility] Division of Hospital Medicine  Trae Bryant MD  Available via MS Teams    SUBJECTIVE / OVERNIGHT EVENTS: No acute events overnight. Pt seen and examined at bedside. Feeling much better today with no SOB, more alert.     ADDITIONAL REVIEW OF SYSTEMS:    MEDICATIONS  (STANDING):  amLODIPine   Tablet 10 milliGRAM(s) Oral daily  buMETAnide Injectable 2 milliGRAM(s) IV Push every 12 hours  chlorhexidine 2% Cloths 1 Application(s) Topical at bedtime  cloNIDine 0.1 milliGRAM(s) Oral three times a day  simvastatin 10 milliGRAM(s) Oral at bedtime    MEDICATIONS  (PRN):      I&O's Summary    19 Mar 2024 07:01  -  20 Mar 2024 07:00  --------------------------------------------------------  IN: 132 mL / OUT: 2800 mL / NET: -2668 mL        PHYSICAL EXAM:  Vital Signs Last 24 Hrs  T(C): 36.2 (20 Mar 2024 04:00), Max: 36.7 (19 Mar 2024 20:24)  T(F): 97.2 (20 Mar 2024 04:00), Max: 98 (19 Mar 2024 20:24)  HR: 56 (20 Mar 2024 08:00) (25 - 59)  BP: 160/60 (20 Mar 2024 08:00) (145/64 - 200/92)  BP(mean): 92 (19 Mar 2024 16:00) (87 - 130)  RR: 18 (20 Mar 2024 04:00) (18 - 38)  SpO2: 95% (20 Mar 2024 04:00) (90% - 100%)    Parameters below as of 20 Mar 2024 04:00  Patient On (Oxygen Delivery Method): nasal cannula  O2 Flow (L/min): 3      LABS:                        8.7    11.99 )-----------( 312      ( 20 Mar 2024 06:45 )             28.1     03-20    144  |  103  |  45<H>  ----------------------------<  94  4.4   |  30  |  2.13<H>    Ca    8.8      20 Mar 2024 06:44  Phos  5.3     03-20  Mg     2.4     03-20    TPro  5.9<L>  /  Alb  3.4  /  TBili  0.3  /  DBili  x   /  AST  20  /  ALT  20  /  AlkPhos  75  03-19    PT/INR - ( 20 Mar 2024 06:46 )   PT: 22.4 sec;   INR: 2.08 ratio         PTT - ( 19 Mar 2024 07:30 )  PTT:32.3 sec  CARDIAC MARKERS ( 18 Mar 2024 21:24 )  x     / x     / 56 U/L / x     / 5.4 ng/mL  CARDIAC MARKERS ( 18 Mar 2024 19:05 )  x     / x     / 63 U/L / x     / 5.5 ng/mL      Urinalysis Basic - ( 20 Mar 2024 06:44 )    Color: x / Appearance: x / SG: x / pH: x  Gluc: 94 mg/dL / Ketone: x  / Bili: x / Urobili: x   Blood: x / Protein: x / Nitrite: x   Leuk Esterase: x / RBC: x / WBC x   Sq Epi: x / Non Sq Epi: x / Bacteria: x        Culture - Blood (collected 18 Mar 2024 20:28)  Source: .Blood Blood  Preliminary Report (20 Mar 2024 01:03):    No growth at 24 hours    Culture - Blood (collected 18 Mar 2024 20:28)  Source: .Blood Blood  Preliminary Report (20 Mar 2024 01:03):    No growth at 24 hours        ABG - ( 19 Mar 2024 07:17 )  pH, Arterial: 7.30  pH, Blood: x     /  pCO2: 52    /  pO2: 86    / HCO3: 26    / Base Excess: -1.1  /  SaO2: 97.8                    RADIOLOGY & ADDITIONAL TESTS:  New Results Reviewed Today:   New Imaging Personally Reviewed Today:  New Electrocardiogram Personally Reviewed Today:  Prior or Outpatient Records Reviewed Today:    COMMUNICATION:  Care Discussed with Consultants/Other Providers and Details of Discussion: Discussed with ACP  Discussions with Patient/Family:  PCP Communication:      CONSTITUTIONAL: NAD, sitting up in bed on NC  NECK: Supple, no palpable masses; no thyromegaly  RESPIRATORY: bibasilar crackles; no wheezing  CARDIOVASCULAR: normal S1 and S2, 1-2+ b/l pitting edema. left upper chest wall post surgical changes  ABDOMEN: soft, nt, nd   PSYCH: A+O to person, place, and time; affect appropriate  SKIN: No rashes; no palpable lesions

## 2024-03-20 NOTE — PROGRESS NOTE ADULT - PROBLEM SELECTOR PLAN 9
DVT: hold for downtrending hgb  Diet: regular  Dispo: PT consult when more euvolemic
DVT: hold for PPM  Diet: regular  Dispo: PT consult when more euvolemic

## 2024-03-21 DIAGNOSIS — R00.1 BRADYCARDIA, UNSPECIFIED: ICD-10-CM

## 2024-03-21 DIAGNOSIS — R06.02 SHORTNESS OF BREATH: ICD-10-CM

## 2024-03-21 DIAGNOSIS — J81.1 CHRONIC PULMONARY EDEMA: ICD-10-CM

## 2024-03-21 DIAGNOSIS — J45.909 UNSPECIFIED ASTHMA, UNCOMPLICATED: ICD-10-CM

## 2024-03-21 LAB
ANION GAP SERPL CALC-SCNC: 13 MMOL/L — SIGNIFICANT CHANGE UP (ref 5–17)
BUN SERPL-MCNC: 62 MG/DL — HIGH (ref 7–23)
CALCIUM SERPL-MCNC: 9 MG/DL — SIGNIFICANT CHANGE UP (ref 8.4–10.5)
CHLORIDE SERPL-SCNC: 101 MMOL/L — SIGNIFICANT CHANGE UP (ref 96–108)
CO2 SERPL-SCNC: 24 MMOL/L — SIGNIFICANT CHANGE UP (ref 22–31)
CREAT SERPL-MCNC: 2.48 MG/DL — HIGH (ref 0.5–1.3)
EGFR: 18 ML/MIN/1.73M2 — LOW
GLUCOSE SERPL-MCNC: 102 MG/DL — HIGH (ref 70–99)
HCT VFR BLD CALC: 28.9 % — LOW (ref 34.5–45)
HGB BLD-MCNC: 9.2 G/DL — LOW (ref 11.5–15.5)
INR BLD: 1.32 RATIO — HIGH (ref 0.85–1.18)
MCHC RBC-ENTMCNC: 29.3 PG — SIGNIFICANT CHANGE UP (ref 27–34)
MCHC RBC-ENTMCNC: 31.8 GM/DL — LOW (ref 32–36)
MCV RBC AUTO: 92 FL — SIGNIFICANT CHANGE UP (ref 80–100)
NRBC # BLD: 0 /100 WBCS — SIGNIFICANT CHANGE UP (ref 0–0)
PLATELET # BLD AUTO: 320 K/UL — SIGNIFICANT CHANGE UP (ref 150–400)
POTASSIUM SERPL-MCNC: 4.7 MMOL/L — SIGNIFICANT CHANGE UP (ref 3.5–5.3)
POTASSIUM SERPL-SCNC: 4.7 MMOL/L — SIGNIFICANT CHANGE UP (ref 3.5–5.3)
PROTHROM AB SERPL-ACNC: 14.4 SEC — HIGH (ref 9.5–13)
RBC # BLD: 3.14 M/UL — LOW (ref 3.8–5.2)
RBC # FLD: 15.6 % — HIGH (ref 10.3–14.5)
SODIUM SERPL-SCNC: 138 MMOL/L — SIGNIFICANT CHANGE UP (ref 135–145)
WBC # BLD: 13.28 K/UL — HIGH (ref 3.8–10.5)
WBC # FLD AUTO: 13.28 K/UL — HIGH (ref 3.8–10.5)

## 2024-03-21 RX ORDER — ACETAMINOPHEN 500 MG
650 TABLET ORAL EVERY 6 HOURS
Refills: 0 | Status: DISCONTINUED | OUTPATIENT
Start: 2024-03-21 | End: 2024-03-22

## 2024-03-21 RX ORDER — WARFARIN SODIUM 2.5 MG/1
1 TABLET ORAL ONCE
Refills: 0 | Status: COMPLETED | OUTPATIENT
Start: 2024-03-21 | End: 2024-03-21

## 2024-03-21 RX ORDER — IPRATROPIUM/ALBUTEROL SULFATE 18-103MCG
3 AEROSOL WITH ADAPTER (GRAM) INHALATION EVERY 6 HOURS
Refills: 0 | Status: DISCONTINUED | OUTPATIENT
Start: 2024-03-21 | End: 2024-03-22

## 2024-03-21 RX ADMIN — Medication 650 MILLIGRAM(S): at 09:42

## 2024-03-21 RX ADMIN — Medication 0.1 MILLIGRAM(S): at 22:37

## 2024-03-21 RX ADMIN — Medication 0.1 MILLIGRAM(S): at 05:48

## 2024-03-21 RX ADMIN — WARFARIN SODIUM 1 MILLIGRAM(S): 2.5 TABLET ORAL at 22:37

## 2024-03-21 RX ADMIN — Medication 0.1 MILLIGRAM(S): at 18:34

## 2024-03-21 RX ADMIN — CHLORHEXIDINE GLUCONATE 1 APPLICATION(S): 213 SOLUTION TOPICAL at 22:38

## 2024-03-21 RX ADMIN — SIMVASTATIN 10 MILLIGRAM(S): 20 TABLET, FILM COATED ORAL at 22:37

## 2024-03-21 RX ADMIN — AMLODIPINE BESYLATE 10 MILLIGRAM(S): 2.5 TABLET ORAL at 05:48

## 2024-03-21 NOTE — PROGRESS NOTE ADULT - ASSESSMENT
Patient is a 89 year old female with PMHx Afib on Coumadin, CVA in 2012 and HTN. Presents to [facility] from her Cardiologist office with hypoxia and bradycardia, now s/p PPM 3/19 for CHB, still with volume overload requiring supplemental O2.     # CHB:  - S/p PPM 3/19     # Acute hypoxemic respiratory failure 2/2 pulmonary edema from heart failure: improving  - s/p Bumex 2mg IVP BID 3/20  - Assess need for further diuretic 3/21  - monitor strict I&Os  - daily standing weights  - titrate O2 to maintain SaO2 > 92% (down to 3L NC)  - Cards/Pulm consults pending (called)    # Acute heart failure:  - HF exacerbation, echo with preserved EF 3/2024. Likely triggered by severe bradycardia/CHB  - Diuresis per Cards, consult pending    # DARREL:  - Baseline Cr around 1.2, likely from acute HF exacerbation/low flow state  - Monitor I/O's  - Serial Cr  - Avoid nephrotoxins  - Renally dose medications  - Continue to monitor  - Renal consult pending (called)    # HTN/PAF::  - C/w CV meds  - Resume AC when cleared by EP    #CVA:  - Prior CVA in 2012, with resultant memory issues  - Not on ASA due to being on full AC  - C/w statin    # DVT ppx:  - IPCs    [employer]

## 2024-03-21 NOTE — CONSULT NOTE ADULT - SUBJECTIVE AND OBJECTIVE BOX
DATE OF SERVICE: 03-21-24 @ 22:44    CHIEF COMPLAINT:Patient is a 89y old  Female who presents with a chief complaint of Bradycardia (21 Mar 2024 21:43)      HISTORY OF PRESENT ILLNESS:HPI:  89 year old female with PMH of HTN, prior CVA, and COPD presented to the ED from her Cardiologist office with hypoxia and bradycardia.  As per daughter, pt has been having low HRs in 30-40 was taken of Toprol, she had been refusing PPM in the past.  She has been having worsening SOB recently, was treated for pneumonia with Augmentin.  EKG showing HR in 25-30s, junctional stefan with slow ventricular response and LBBB.  Admitted to [facility] for further management.  (18 Mar 2024 23:28)      PAST MEDICAL & SURGICAL HISTORY:  Stroke      HTN (hypertension)      Afib        MEDICATIONS:  amLODIPine   Tablet 10 milliGRAM(s) Oral daily  cloNIDine 0.1 milliGRAM(s) Oral three times a day      albuterol/ipratropium for Nebulization 3 milliLiter(s) Nebulizer every 6 hours PRN    acetaminophen     Tablet .. 650 milliGRAM(s) Oral every 6 hours PRN      simvastatin 10 milliGRAM(s) Oral at bedtime    chlorhexidine 2% Cloths 1 Application(s) Topical at bedtime      FAMILY HISTORY:      Non-contributory    SOCIAL HISTORY:    [ ] not a smoker    Allergies    No Known Allergies    Intolerances    	    REVIEW OF SYSTEMS:  CONSTITUTIONAL: No fever  EYES: No eye pain, visual disturbances, or discharge  ENMT:  No difficulty hearing, tinnitus  NECK: No pain or stiffness  RESPIRATORY: No cough, wheezing,  CARDIOVASCULAR: No chest pain, palpitations, passing out, dizziness, or leg swelling  GASTROINTESTINAL:  No nausea, vomiting, diarrhea or constipation. No melena.  GENITOURINARY: No dysuria, hematuria  NEUROLOGICAL: No stroke like symptoms  SKIN: No burning or lesions   ENDOCRINE: No heat or cold intolerance  MUSCULOSKELETAL: No joint pain or swelling  PSYCHIATRIC: No  anxiety, mood swings  HEME/LYMPH: No bleeding gums  ALLERGY AND IMMUNOLOGIC: No hives or eczema	    All other ROS negative    PHYSICAL EXAM:  T(C): 36.3 (03-21-24 @ 22:15), Max: 36.4 (03-21-24 @ 04:29)  HR: 53 (03-21-24 @ 22:15) (50 - 53)  BP: 171/61 (03-21-24 @ 22:15) (159/60 - 171/61)  RR: 18 (03-21-24 @ 22:15) (18 - 19)  SpO2: 98% (03-21-24 @ 22:15) (95% - 98%)  Wt(kg): --  I&O's Summary    20 Mar 2024 07:01  -  21 Mar 2024 07:00  --------------------------------------------------------  IN: 660 mL / OUT: 1650 mL / NET: -990 mL    21 Mar 2024 07:01  -  21 Mar 2024 22:44  --------------------------------------------------------  IN: 240 mL / OUT: 1100 mL / NET: -860 mL        Appearance: Normal	  HEENT:   Normal oral mucosa, EOMI	  Cardiovascular:  S1 S2, No JVD,    Respiratory: Lungs clear to auscultation	  Psychiatry: Alert  Gastrointestinal:  Soft, Non-tender, + BS	  Skin: No rashes   Neurologic: Non-focal  Extremities:  No edema  Vascular: Peripheral pulses palpable    	    	  	  CARDIAC MARKERS:  Labs personally reviewed by me                                  9.2    13.28 )-----------( 320      ( 21 Mar 2024 06:59 )             28.9     03-21    138  |  101  |  62<H>  ----------------------------<  102<H>  4.7   |  24  |  2.48<H>    Ca    9.0      21 Mar 2024 06:59  Phos  5.3     03-20  Mg     2.4     03-20            EKG: Personally reviewed by me - sinus arrest with ventricular escape LBBB morphology  Radiology: Personally reviewed by me - CT chest Findings suggestive of pulmonary edema.    TTE CONCLUSIONS:      1. Left ventricular cavity is normal in size. Left ventricular wall thickness is normal. Left ventricular systolic function is normal with an ejection fraction of 64 % by Pendleton's method of disks. There are no regional wall motion abnormalities seen.   2. There is increased LV mass and concentric hypertrophy.   3. Unable to assess left ventricular diastolic function due to insufficient data.   4. Moderately enlarged right ventricular cavity size and normal systolic function.   5. The left atrium is severely dilated.   6. The right atrium is dilated.   7. Mild mitral regurgitation at a blood pressure of 163/69 mmHg.   8. There is moderate tricuspid regurgitation. Estimated pulmonary artery systolic pressure is 54 mmHg.   9. No pericardial effusion seen.  10. No prior echocardiogram is available for comparison.        Assessment and Plan:   Patient is a 89 year old female with PMHx Afib on Coumadin, CVA in 2012 and HTN. Presents to [facility] from her Cardiologist office with hypoxia and bradycardia, now s/p PPM 3/19 for CHB, still with volume overload requiring supplemental O2.     1.  CHB:  - S/p PPM 3/19     2. Acute diastolic HF likely 2/2 CHB  - s/p Bumex 2mg IVP BID 3/20  - now euvolemic 95% on RA  - monitor strict I&Os    3. DARREL:  - Baseline Cr around 1.2, likely from acute HF exacerbation/low flow state  -  Renal recs appreciated  - hold diuretics    4. PAF:  - Resume AC  - Paced at 50bpm    5. DVT ppx:  - On AC          Differential diagnosis and plan of care discussed with patient after the evaluation. Counseling on diet, nutritional counseling, weight management, exercise and medication compliance was done.   Advanced care planning/advanced directives discussed with patient/family. DNR status including forceful chest compressions to attempt to restart the heart, ventilator support/artificial breathing, electric shock, artificial nutrition, health care proxy, Molst form all discussed with pt. Pt wishes to consider. Sixteen minutes spent on discussing advanced directives.           Renan Cortez DO [facility]  Cardiovascular Medicine  [address], [address]  Office [phone]  Available via call/text on Microsoft Teams

## 2024-03-21 NOTE — CONSULT NOTE ADULT - SUBJECTIVE AND OBJECTIVE BOX
[facility]  [phone]  NEPHROLOGY      INITIAL CONSULT NOTE  --------------------------------------------------------------------------------  HPI:      89 year old female with PMH of HTN, prior CVA, and COPD presented to the ED from her Cardiologist office with hypoxia and bradycardia.  As per daughter, pt has been having low HRs in 30-40 was taken of Toprol, she had been refusing PPM in the past.  She has been having worsening SOB recently, was treated for pneumonia with Augmentin.  EKG showing HR in 25-30s, junctional stefan with slow ventricular response and LBBB.  Admitted to [facility] for further management.  pt started on diuretics. noticed with cr 1.9 range on admission increased to 2.48 renal consult called.         PAST HISTORY  --------------------------------------------------------------------------------  PAST MEDICAL & SURGICAL HISTORY:  Stroke      HTN (hypertension)      Afib        FAMILY HISTORY:    PAST SOCIAL HISTORY:  no tobacco     ALLERGIES & MEDICATIONS  --------------------------------------------------------------------------------  Allergies    No Known Allergies    Intolerances      Standing Inpatient Medications  amLODIPine   Tablet 10 milliGRAM(s) Oral daily  chlorhexidine 2% Cloths 1 Application(s) Topical at bedtime  cloNIDine 0.1 milliGRAM(s) Oral three times a day  simvastatin 10 milliGRAM(s) Oral at bedtime  warfarin 1 milliGRAM(s) Oral once    PRN Inpatient Medications  acetaminophen     Tablet .. 650 milliGRAM(s) Oral every 6 hours PRN  albuterol/ipratropium for Nebulization 3 milliLiter(s) Nebulizer every 6 hours PRN      REVIEW OF SYSTEMS  --------------------------------------------------------------------------------  Gen: No  fevers/chills   Skin: No rashes  Head/Eyes/Ears/Mouth: No headache; Normal hearing;  No sinus pain/discomfort, sore throat  Respiratory: No dyspnea, cough, wheezing, hemoptysis  CV: No chest pain, orthopnea  GI: No abdominal pain, diarrhea, nausea, vomiting  : No dysuria,   MSK: No joint pain/swelling; no back pain  Neuro: No dizziness/lightheadedness  also with no edema       VITALS/PHYSICAL EXAM  --------------------------------------------------------------------------------  T(C): 36.3 (03-21-24 @ 12:07), Max: 36.4 (03-21-24 @ 04:29)  HR: 51 (03-21-24 @ 12:07) (50 - 51)  BP: 160/72 (03-21-24 @ 12:07) (159/60 - 160/72)  RR: 19 (03-21-24 @ 12:07) (18 - 19)  SpO2: 95% (03-21-24 @ 12:07) (95% - 97%)  Wt(kg): --    Weight (kg): 82.645 (03-21-24 @ 07:36)      03-20-24 @ 07:01  -  03-21-24 @ 07:00  --------------------------------------------------------  IN: 660 mL / OUT: 1650 mL / NET: -990 mL    03-21-24 @ 07:01  -  03-21-24 @ 21:46  --------------------------------------------------------  IN: 240 mL / OUT: 1100 mL / NET: -860 mL      Physical Exam:  	Gen: Non toxic comfortable appearing   	no jvd   	Pulm: decrease bs  no rales or ronchi or wheezing  	CV: RRR, S1S2; no rub  	Abd: +BS, soft, nontender/nondistended  	: No suprapubic tenderness  	UE: Warm, no cyanosis  no clubbing,  no edema  	LE: Warm, no cyanosis  no clubbing, no edema  	Neuro: alert and oriented. speech coherent   	Skin: Warm, no decrease skin turgor       LABS/STUDIES  --------------------------------------------------------------------------------              9.2    13.28 >-----------<  320      [03-21-24 @ 06:59]              28.9     138  |  101  |  62  ----------------------------<  102      [03-21-24 @ 06:59]  4.7   |  24  |  2.48        Ca     9.0     [03-21-24 @ 06:59]      Mg     2.4     [03-20-24 @ 06:44]      Phos  5.3     [03-20-24 @ 06:44]      PT/INR: PT 14.4 , INR 1.32       [03-21-24 @ 07:00]      Creatinine Trend:  SCr 2.48 [03-21 @ 06:59]  SCr 2.13 [03-20 @ 06:44]  SCr 2.17 [03-19 @ 18:34]  SCr 2.15 [03-19 @ 07:30]  SCr 1.96 [03-18 @ 21:24]        Iron 20, TIBC 263, %sat 8      [03-20-24 @ 06:42]  Ferritin 89      [03-20-24 @ 06:42]  TSH 2.19      [03-19-24 @ 00:53]  Lipid: chol 142, , HDL 63, LDL --      [03-19-24 @ 00:53]      < from: Xray Chest 1 View- PORTABLE-Urgent (Xray Chest 1 View- PORTABLE-Urgent .) (03.19.24 @ 14:21) >    ACC: [id number] EXAM:  XR CHEST PORTABLE URGENT 1V   ORDERED BY: LIZANDRO ADRIAN     PROCEDURE DATE:  03/19/2024          INTERPRETATION:  EXAMINATION: XR CHEST URGENT    CLINICAL INDICATION: New Single chamber PPM    TECHNIQUE: Single frontal portable view of the chest from 3/19/2024 2:21   PM    COMPARISON: Chest x-ray 3/18/2024. Chest CT 3/18/2024    FINDINGS:  Interval placement of left chest wall pacemaker.  The heart size is unchanged.  Stable mild pulmonary vascular congestive changes.  Hazinessinvolving the right lower lung, corresponding to patchy   opacities better described on CT.  There is no pneumothorax. No sizable pleural effusion.    IMPRESSION:  No pneumothorax post pacemaker placement.  Stable mild pulmonary vascular congestive change.  Hazy opacities at the right lung base.    --- End of Report ---    < end of copied text >

## 2024-03-21 NOTE — CONSULT NOTE ADULT - PROBLEM SELECTOR RECOMMENDATION 9
Likely 2nd to fluid overload  -CT chest 3/18 with pulmonary edema, small b/l effusions. ProBNP >22,000  -SOB, hypoxia appear to have resolved, seen on room air today with sats mid 90s  -No c/o dyspnea at this time  -Keep O>I as tolerated  -CXR in AM to f/u pulm edema  -VBG in AM  -Start Duoneb q6h PRN for SOB/wheezing  -Keep sats >90% with O2 PRN.

## 2024-03-21 NOTE — CHART NOTE - NSCHARTNOTEFT_GEN_A_CORE
Pt with h/o AFib on coumadin. INR 1.3 today. Discussed with EPS attending. pt to restart coumadin today. Instructed to restart at lower dose since pt with recent supra therapeutic INR.

## 2024-03-21 NOTE — PROGRESS NOTE ADULT - SUBJECTIVE AND OBJECTIVE BOX
SUBJECTIVE / OVERNIGHT EVENTS:          --------------------------------------------------------------------------------------------  LABS:                        9.2    13.28 )-----------( 320      ( 21 Mar 2024 06:59 )             28.9     03-21    138  |  101  |  62<H>  ----------------------------<  102<H>  4.7   |  24  |  2.48<H>    Ca    9.0      21 Mar 2024 06:59  Phos  5.3     03-20  Mg     2.4     03-20      PT/INR - ( 21 Mar 2024 07:00 )   PT: 14.4 sec;   INR: 1.32 ratio           CAPILLARY BLOOD GLUCOSE            Urinalysis Basic - ( 21 Mar 2024 06:59 )    Color: x / Appearance: x / SG: x / pH: x  Gluc: 102 mg/dL / Ketone: x  / Bili: x / Urobili: x   Blood: x / Protein: x / Nitrite: x   Leuk Esterase: x / RBC: x / WBC x   Sq Epi: x / Non Sq Epi: x / Bacteria: x        RADIOLOGY & ADDITIONAL TESTS:    Imaging Personally Reviewed:  [x] YES  [ ] NO    Consultant(s) Notes Reviewed:  [x] YES  [ ] NO    MEDICATIONS  (STANDING):  amLODIPine   Tablet 10 milliGRAM(s) Oral daily  chlorhexidine 2% Cloths 1 Application(s) Topical at bedtime  cloNIDine 0.1 milliGRAM(s) Oral three times a day  simvastatin 10 milliGRAM(s) Oral at bedtime    MEDICATIONS  (PRN):  acetaminophen     Tablet .. 650 milliGRAM(s) Oral every 6 hours PRN Moderate Pain (4 - 6)      Care Discussed with Consultants/Other Providers [x] YES  [ ] NO    Vital Signs Last 24 Hrs  T(C): 36.4 (21 Mar 2024 04:29), Max: 36.4 (20 Mar 2024 11:49)  T(F): 97.5 (21 Mar 2024 04:29), Max: 97.5 (20 Mar 2024 11:49)  HR: 50 (21 Mar 2024 04:29) (50 - 64)  BP: 159/60 (21 Mar 2024 04:29) (149/66 - 169/62)  BP(mean): --  RR: 18 (21 Mar 2024 04:29) (18 - 18)  SpO2: 97% (21 Mar 2024 04:29) (93% - 97%)    Parameters below as of 21 Mar 2024 04:29  Patient On (Oxygen Delivery Method): nasal cannula  O2 Flow (L/min): 4    I&O's Summary    20 Mar 2024 07:01  -  21 Mar 2024 07:00  --------------------------------------------------------  IN: 660 mL / OUT: 1650 mL / NET: -990 mL           SUBJECTIVE / OVERNIGHT EVENTS:    patient seen and examined  resting comfortably OOB in chair on RA    --------------------------------------------------------------------------------------------  LABS:                        9.2    13.28 )-----------( 320      ( 21 Mar 2024 06:59 )             28.9     03-21    138  |  101  |  62<H>  ----------------------------<  102<H>  4.7   |  24  |  2.48<H>    Ca    9.0      21 Mar 2024 06:59  Phos  5.3     03-20  Mg     2.4     03-20      PT/INR - ( 21 Mar 2024 07:00 )   PT: 14.4 sec;   INR: 1.32 ratio           CAPILLARY BLOOD GLUCOSE            Urinalysis Basic - ( 21 Mar 2024 06:59 )    Color: x / Appearance: x / SG: x / pH: x  Gluc: 102 mg/dL / Ketone: x  / Bili: x / Urobili: x   Blood: x / Protein: x / Nitrite: x   Leuk Esterase: x / RBC: x / WBC x   Sq Epi: x / Non Sq Epi: x / Bacteria: x        RADIOLOGY & ADDITIONAL TESTS:    Imaging Personally Reviewed:  [x] YES  [ ] NO    Consultant(s) Notes Reviewed:  [x] YES  [ ] NO    MEDICATIONS  (STANDING):  amLODIPine   Tablet 10 milliGRAM(s) Oral daily  chlorhexidine 2% Cloths 1 Application(s) Topical at bedtime  cloNIDine 0.1 milliGRAM(s) Oral three times a day  simvastatin 10 milliGRAM(s) Oral at bedtime    MEDICATIONS  (PRN):  acetaminophen     Tablet .. 650 milliGRAM(s) Oral every 6 hours PRN Moderate Pain (4 - 6)      Care Discussed with Consultants/Other Providers [x] YES  [ ] NO    Vital Signs Last 24 Hrs  T(C): 36.4 (21 Mar 2024 04:29), Max: 36.4 (20 Mar 2024 11:49)  T(F): 97.5 (21 Mar 2024 04:29), Max: 97.5 (20 Mar 2024 11:49)  HR: 50 (21 Mar 2024 04:29) (50 - 64)  BP: 159/60 (21 Mar 2024 04:29) (149/66 - 169/62)  BP(mean): --  RR: 18 (21 Mar 2024 04:29) (18 - 18)  SpO2: 97% (21 Mar 2024 04:29) (93% - 97%)    Parameters below as of 21 Mar 2024 04:29  Patient On (Oxygen Delivery Method): nasal cannula  O2 Flow (L/min): 4    I&O's Summary    20 Mar 2024 07:01  -  21 Mar 2024 07:00  --------------------------------------------------------  IN: 660 mL / OUT: 1650 mL / NET: -990 mL    PHYSICAL EXAM:  GENERAL: NAD, well-developed, comfortable  HEAD:  Atraumatic, Normocephalic  EYES: EOMI, PERRLA, conjunctiva and sclera clear  NECK: Supple, No JVD  CHEST/LUNG: BL Crackles, L ACW c/d/i  HEART: Regular rate and rhythm; No murmurs, rubs, or gallops  ABDOMEN: Soft, Nontender, Nondistended; Bowel sounds present  NEURO: AAOx2-3, no focal weakness, 5/5 b/l extremity strength, b/l knee no arthritis, no effusion   EXTREMITIES:  2+ Peripheral Pulses, No clubbing, cyanosis, BL LE edema  SKIN: No rashes or lesions

## 2024-03-21 NOTE — CONSULT NOTE ADULT - PROBLEM SELECTOR RECOMMENDATION 2
CT chest 3/18 with pulmonary edema, small b/l effusions  -TTE reviewed  -ProBNP >22,000. Continue to trend (ordered for AM)  -Keep O>I as tolerated.

## 2024-03-21 NOTE — CONSULT NOTE ADULT - NS ATTEND AMEND GEN_ALL_CORE FT
pt seen and examined and history and physical reviewed:  now on floor,  to me looks comfortable: no wheezing:  she is on prn nebs at home for asthma:  she has a remote smoking history: she has mild chf on chest xray  :  that can explain her mild sob:  she has not been wheezing:  BD prn  :  Her last ABG was with mild hypercarbic acidosis:  but she is alert and awake at this time; no need for bipap : brijesh repeat Abg IN AM

## 2024-03-21 NOTE — CONSULT NOTE ADULT - ASSESSMENT
89 year old female with PMH of HTN, prior CVA, and COPD presented to the ED from her Cardiologist office with hypoxia and bradycardia.  As per daughter, pt has been having low HRs in 30-40 was taken of Toprol, she had been refusing PPM in the past.  She has been having worsening SOB recently, was treated for pneumonia with Augmentin.  EKG showing HR in 25-30s, junctional stefan with slow ventricular response and LBBB.  Admitted to [facility] for further management.  pt started on diuretics. noticed with cr 1.9 range on admission increased to 2.48/ pt also with CHB requiring PPM. also CHF     1- DARREL on CKD   2- CHF   3- HTN     cr higher in setting  of diuretics/passive congestion  likely   cont gallego   strict I/O   diurese as needed to keep O> I   norvasc 10 mg daily and clonidine 0.1 mg bid   venofer 100 mg ivss daily x 3 days for anemia   d/w pt family at bedside 
88 y/o F with PMH of HTN, prior CVA, ? COPD. Presented to ED from her cardiologist's office with reported bradycardia, hypoxia. Also with worsening SOB, completed course of Augmentin as an outpatient for presumed PNA. EKG on admission with HR 25-30s, junctional stefan w/ slow ventricular response and LBBB, admitted to CICU for further management.  Now s/p PPM placement on 3/19. Pulmonary called to consult for hypoxia, SOB likely in the setting of volume overload, now resolving.

## 2024-03-21 NOTE — CONSULT NOTE ADULT - SUBJECTIVE AND OBJECTIVE BOX
Pulmonary Consult   03-21-24 @ 13:00    Patient is a 89y old  Female who presents with a chief complaint of Bradycardia (21 Mar 2024 10:48)      HPI: 89 y/o F with PMH of HTN, prior CVA, ? COPD. Presented to ED from her cardiologist's office with reported bradycardia, hypoxia. Also with worsening SOB, completed course of Augmentin as an outpatient for presumed PNA. EKG on admission with HR 25-30s, junctional stefan w/ slow ventricular response and LBBB, admitted to CICU for further management.  Now s/p PPM placement on 3/19. Pulmonary called to consult for hypoxia, SOB likely in the setting of volume overload, now resolving. Former smoker (pt reports quit >50 years ago). Reports hx of asthma, denies hx of COPD. Denies inhaler use at home. At this time denies SOB, CP, pleuritic chest pain, fever, chills. O2 sats 94% on room air.       ?FOLLOWING PRESENT  [ x ] Hx of PE/DVT, [ per chart ] Hx COPD, [ yes ] Hx of Asthma, [x ] Hx of Hospitalization, [x ]  Hx of BiPAP/CPAP use, [ x] Hx of GILBERT    No Known Allergies    PAST MEDICAL & SURGICAL HISTORY:  Stroke  HTN (hypertension)  Afib    FAMILY HISTORY: non contributory       Social History: [ former ] TOBACCO                  [ x ] ETOH                                 [ x ] IVDA/DRUGS    REVIEW OF SYSTEMS    General: x	    Skin/Breast: x  	  Ophthalmologic: x  	  ENMT:	x    Respiratory and Thorax: as above  	  Cardiovascular:	as above     Gastrointestinal:	 x     Genitourinary:	 x    Musculoskeletal:	 x    Neurological:	 x    Psychiatric:	 x    Hematology/Lymphatics:	 x    Endocrine:	x    Allergic/Immunologic:	x    MEDICATIONS  (STANDING):  amLODIPine   Tablet 10 milliGRAM(s) Oral daily  chlorhexidine 2% Cloths 1 Application(s) Topical at bedtime  cloNIDine 0.1 milliGRAM(s) Oral three times a day  simvastatin 10 milliGRAM(s) Oral at bedtime    MEDICATIONS  (PRN):  acetaminophen     Tablet .. 650 milliGRAM(s) Oral every 6 hours PRN Moderate Pain (4 - 6)    Vital Signs Last 24 Hrs  T(C): 36.3 (21 Mar 2024 12:07), Max: 36.4 (20 Mar 2024 17:05)  T(F): 97.4 (21 Mar 2024 12:07), Max: 97.5 (20 Mar 2024 17:05)  HR: 51 (21 Mar 2024 12:07) (50 - 64)  BP: 160/72 (21 Mar 2024 12:07) (156/68 - 168/64)  BP(mean): --  RR: 19 (21 Mar 2024 12:07) (18 - 19)  SpO2: 95% (21 Mar 2024 12:07) (95% - 97%)    Parameters below as of 21 Mar 2024 12:07  Patient On (Oxygen Delivery Method): room air    I&O's Summary    20 Mar 2024 07:01  -  21 Mar 2024 07:00  --------------------------------------------------------  IN: 660 mL / OUT: 1650 mL / NET: -990 mL    Physical Exam:   GENERAL: NAD  HEENT: BURKE  ENMT: No tonsillar erythema, exudates, or enlargement  NECK: Supple, No JVD  CHEST/LUNG: Few basilar crackles    CVS: Regular rate and rhythm  GI: : Soft, Nontender, Nondistended  NERVOUS SYSTEM:  Alert & Oriented X3  EXTREMITIES:  2+ Peripheral Pulses, mild b/l LE edema   SKIN: No rashes or lesions  PSYCH: Appropriate    Labs:  -1.5<53<4>>43<<7.295>>-1.5<<3><<4><<5<<439>>, -7.6<45<4>>35<<7.245>>-7.6<<3><<4><<5<<359>>, Venous<51<4>>26<<7.325>>Venous<<3><<4><<5<<269>>                            9.2    13.28 )-----------( 320      ( 21 Mar 2024 06:59 )             28.9                         8.7    11.99 )-----------( 312      ( 20 Mar 2024 06:45 )             28.1                         9.2    15.56 )-----------( 350      ( 19 Mar 2024 18:34 )             29.6                         8.5    13.40 )-----------( 309      ( 19 Mar 2024 07:30 )             26.8                         10.0   15.61 )-----------( 473      ( 18 Mar 2024 19:05 )             31.9     03-21    138  |  101  |  62<H>  ----------------------------<  102<H>  4.7   |  24  |  2.48<H>  03-20    144  |  103  |  45<H>  ----------------------------<  94  4.4   |  30  |  2.13<H>  03-19    140  |  102  |  44<H>  ----------------------------<  120<H>  4.9   |  27  |  2.17<H>  03-19    138  |  103  |  45<H>  ----------------------------<  160<H>  5.0   |  21<L>  |  2.15<H>  03-18    138  |  102  |  41<H>  ----------------------------<  155<H>  4.8   |  23  |  1.96<H>  03-18    138  |  101  |  39<H>  ----------------------------<  200<H>  4.9   |  22  |  1.92<H>    Ca    9.0      21 Mar 2024 06:59  Ca    8.8      20 Mar 2024 06:44  Ca    9.3      19 Mar 2024 18:34  Phos  5.3     03-20  Mg     2.4     03-20  Mg     2.5     03-19    TPro  5.9<L>  /  Alb  3.4  /  TBili  0.3  /  DBili  x   /  AST  20  /  ALT  20  /  AlkPhos  75  03-19  TPro  7.2  /  Alb  4.0  /  TBili  0.4  /  DBili  x   /  AST  24  /  ALT  22  /  AlkPhos  93  03-18  TPro  7.3  /  Alb  4.0  /  TBili  0.5  /  DBili  x   /  AST  26  /  ALT  23  /  AlkPhos  93  03-18    CAPILLARY BLOOD GLUCOSE          PT/INR - ( 21 Mar 2024 07:00 )   PT: 14.4 sec;   INR: 1.32 ratio           Urinalysis Basic - ( 21 Mar 2024 06:59 )    Color: x / Appearance: x / SG: x / pH: x  Gluc: 102 mg/dL / Ketone: x  / Bili: x / Urobili: x   Blood: x / Protein: x / Nitrite: x   Leuk Esterase: x / RBC: x / WBC x   Sq Epi: x / Non Sq Epi: x / Bacteria: x      Culture - Blood (collected 18 Mar 2024 20:28)  Source: .Blood Blood  Preliminary Report (21 Mar 2024 01:02):    No growth at 48 Hours    Culture - Blood (collected 18 Mar 2024 20:28)  Source: .Blood Blood  Preliminary Report (21 Mar 2024 01:02):    No growth at 48 Hours      D DImer  Procalcitonin, Serum: 0.10 ng/mL (03-18 @ 19:05)      Studies    CT SCAN Chest   < from: CT Chest No Cont (03.18.24 @ 23:19) >    INTERPRETATION:  Clinical information: Shortness of breath.    CT scan of the chest was obtained without administration of intravenous   contrast.    Small low-attenuation lesion is noted in the left lobe of the thyroid   gland.    No hilar or mediastinal adenopathy is noted.    Heart is markedly enlarged in size. Calcification of the coronary   arteries noted. No pericardial effusion is noted.    No endobronchial lesions are noted. Patchy groundglass opacities are   noted within both lungs, more so in both upper lobes. Minimal compressive   atelectasis is noted involving portions of both lower lobes. This is   secondary to small bilateral pleural effusions.    Below the diaphragm, visualized portions of the abdomen demonstrate   low-attenuation lesions within both kidneys which are too small to be   adequately characterized on this exam. Thickening/nodularity of both   adrenal glands is noted.    Degenerative changes of the spine are noted.    IMPRESSION: Findings suggestive of pulmonary edema.    --- End of Report ---    < end of copied text >        < from: TTE W or WO Ultrasound Enhancing Agent (03.19.24 @ 07:33) >     CONCLUSIONS:      1. Left ventricular cavity is normal in size. Left ventricular wall thickness is normal. Left ventricular systolic function is normal with an ejection fraction of 64 % by Pendleton's method of disks. There are no regional wall motion abnormalities seen.   2. There is increased LV mass and concentric hypertrophy.   3. Unable to assess left ventricular diastolic function due to insufficient data.   4. Moderately enlarged right ventricular cavity size and normal systolic function.   5. The left atrium is severely dilated.   6. The right atrium is dilated.   7. Mild mitral regurgitation at a blood pressure of 163/69 mmHg.   8. There is moderate tricuspid regurgitation. Estimated pulmonary artery systolic pressure is 54 mmHg.   9. No pericardial effusion seen.  10. No prior echocardiogram is available for comparison.    ________________________________________________________________________________________  FINDINGS:     Left Ventricle:  The left ventricular cavity is normal in size. Left ventricular wall thickness is normal. Left ventricular systolic function is normal with a calculated ejection fraction of 64 % by the Pendleton's biplane method of disks. There are no regional wall motion abnormalities seen. Unable to assess left ventricular diastolic function due to insufficient data. Mild left ventricular hypertrophy. There is increased LV mass and concentric hypertrophy.  LV Wall Scoring: All segments are normal.          Right Ventricle:  The right ventricular cavity is moderately enlarged in size and normal systolic function. Tricuspid annular plane systolic excursion (TAPSE) is 2.4 cm (normal >=1.7 cm). Tricuspid annular tissue Doppler S' is 12.0 cm/s (normal >10 cm/s).     Left Atrium:  The left atrium is severely dilated with an indexed volume of 46 ml/m².     Right Atrium:  The right atrium is dilated with an indexed volume of 58.16 ml/m².     Interatrial Septum:  The interatrial septum appears intact.     Aortic Valve:  The aortic valve appears trileaflet with normal systolic excursion. There is no aortic valve stenosis. There is trace aortic regurgitation.     Mitral Valve:  Structurally normal mitral valve with normal leaflet excursion. There is no mitral valve stenosis. There is mild mitral regurgitation at a blood pressure of 163/69 mmHg.     Tricuspid Valve:  Structurally normal tricuspid valve with normal leaflet excursion. There is moderate tricuspid regurgitation. Estimated pulmonary artery systolic pressure is 54 mmHg.     Pulmonic Valve:  Structurally normal pulmonic valve with normal leaflet excursion. There is no pulmonic valve stenosis. There is trace pulmonic regurgitation.     Aorta:  The aortic root appears normal in size. The aortic root at the sinuses of Valsalva is normal in size, measuring 3.00 cm (indexed 1.62 cm/m²).     Pericardium:  No pericardial effusion seen.     Systemic Veins:  The inferior vena cava is normal in size (normal <2.1cm) with abnormal inspiratory collapse (abnormal <50%) consistent with mildly elevated right atrial pressure (~8, range 5-10mmHg).  ____________________________________________________________________  QUANTITATIVE DATA:  Left Ventricle Measurements: (Indexed to BSA)     IVSd (2D):   1.2 cm  LVPWd (2D):  1.2 cm  LVIDd (2D):  5.1 cm  LVIDs (2D):  3.4 cm  LV Mass:     232 g  124.7 g/m²  LV Vol d, MOD A2C: 115.0 ml 61.93 ml/m²  LV Vol d, MOD A4C: 103.0 ml 55.46 ml/m²  LV Vol d, MOD BP:  111.3 ml 59.96 ml/m²  LV Vol s, MOD A2C: 40.1 ml  21.59 ml/m²  LV Vol s, MOD A4C: 38.5 ml  20.73 ml/m²  LV Vol s, MOD BP:  39.6 ml  21.31 ml/m²  LVOT SV MOD BP:    71.8 ml  LV EF% MOD BP:     64 %    Aorta Measurements: (Normal range) (Indexed to BSA)     Sinuses of Valsalva: 3.00 cm (2.7 - 3.3 cm)       Left Atrium Measurements: (Indexed to BSA)  LA Diam 2D: 4.70 cm  LA Vol BP:  85.0 ml. 46 ml/m².    Right Ventricle Measurements: Right Atrial Measurements:     TAPSE: 2.4 cm                 RA Vol:       108.00 ml                                RA Vol Index: 58.16 ml/m²       Tricuspid Valve Measurements:     TR Vmax:          3.4 m/s  TR Peak Gradient: 45.7 mmHg  RA Pressure:      8 mmHg  PASP:           54 mmHg      < end of copied text >               Pulmonary Consult   03-21-24 @ 13:00    Patient is a 89y old  Female who presents with a chief complaint of Bradycardia (21 Mar 2024 10:48)      HPI: 89 y/o F with PMH of HTN, prior CVA, ? COPD. Presented to ED from her cardiologist's office with reported bradycardia, hypoxia. Also with worsening SOB, completed course of Augmentin as an outpatient for presumed PNA. EKG on admission with HR 25-30s, junctional stefan w/ slow ventricular response and LBBB, admitted to CICU for further management.  Now s/p PPM placement on 3/19. Pulmonary called to consult for hypoxia, SOB likely in the setting of volume overload, now resolving. Former smoker (pt reports quit >50 years ago). Reports hx of asthma, denies hx of COPD. Denies inhaler use at home. At this time denies SOB, CP, pleuritic chest pain, fever, chills. O2 sats 94% on room air.       ?FOLLOWING PRESENT  [ x ] Hx of PE/DVT, [ per chart ] Hx COPD, [ yes ] Hx of Asthma, [x ] Hx of Hospitalization, [x ]  Hx of BiPAP/CPAP use, [ x] Hx of GILBERT    No Known Allergies    PAST MEDICAL & SURGICAL HISTORY:  Stroke  HTN (hypertension)  Afib    FAMILY HISTORY: non contributory       Social History: [ former ] TOBACCO                  [ x ] ETOH                                 [ x ] IVDA/DRUGS    REVIEW OF SYSTEMS    General: x	    Skin/Breast: x  	  Ophthalmologic: x  	  ENMT:	x    Respiratory and Thorax: as above  	  Cardiovascular:	as above     Gastrointestinal:	 x     Genitourinary:	 x    Musculoskeletal:	 x    Neurological:	 x    Psychiatric:	 x    Hematology/Lymphatics:	 x    Endocrine:	x    Allergic/Immunologic:	x    MEDICATIONS  (STANDING):  amLODIPine   Tablet 10 milliGRAM(s) Oral daily  chlorhexidine 2% Cloths 1 Application(s) Topical at bedtime  cloNIDine 0.1 milliGRAM(s) Oral three times a day  simvastatin 10 milliGRAM(s) Oral at bedtime    MEDICATIONS  (PRN):  acetaminophen     Tablet .. 650 milliGRAM(s) Oral every 6 hours PRN Moderate Pain (4 - 6)    Vital Signs Last 24 Hrs  T(C): 36.3 (21 Mar 2024 12:07), Max: 36.4 (20 Mar 2024 17:05)  T(F): 97.4 (21 Mar 2024 12:07), Max: 97.5 (20 Mar 2024 17:05)  HR: 51 (21 Mar 2024 12:07) (50 - 64)  BP: 160/72 (21 Mar 2024 12:07) (156/68 - 168/64)  BP(mean): --  RR: 19 (21 Mar 2024 12:07) (18 - 19)  SpO2: 95% (21 Mar 2024 12:07) (95% - 97%)    Parameters below as of 21 Mar 2024 12:07  Patient On (Oxygen Delivery Method): room air    I&O's Summary    20 Mar 2024 07:01  -  21 Mar 2024 07:00  --------------------------------------------------------  IN: 660 mL / OUT: 1650 mL / NET: -990 mL    Physical Exam:   GENERAL: NAD  HEENT: BURKE  ENMT: No tonsillar erythema, exudates, or enlargement  NECK: Supple, No JVD  CHEST/LUNG: Few basilar crackles    CVS: Regular rate and rhythm  GI: : Soft, Nontender, Nondistended  NERVOUS SYSTEM:  Alert & Oriented X3  EXTREMITIES:  2+ Peripheral Pulses, mild b/l LE edema   SKIN: No rashes or lesions  PSYCH: Appropriate    Labs:  -1.5<53<4>>43<<7.295>>-1.5<<3><<4><<5<<439>>, -7.6<45<4>>35<<7.245>>-7.6<<3><<4><<5<<359>>, Venous<51<4>>26<<7.325>>Venous<<3><<4><<5<<269>>                            9.2    13.28 )-----------( 320      ( 21 Mar 2024 06:59 )             28.9                         8.7    11.99 )-----------( 312      ( 20 Mar 2024 06:45 )             28.1                         9.2    15.56 )-----------( 350      ( 19 Mar 2024 18:34 )             29.6                         8.5    13.40 )-----------( 309      ( 19 Mar 2024 07:30 )             26.8                         10.0   15.61 )-----------( 473      ( 18 Mar 2024 19:05 )             31.9     03-21    138  |  101  |  62<H>  ----------------------------<  102<H>  4.7   |  24  |  2.48<H>  03-20    144  |  103  |  45<H>  ----------------------------<  94  4.4   |  30  |  2.13<H>  03-19    140  |  102  |  44<H>  ----------------------------<  120<H>  4.9   |  27  |  2.17<H>  03-19    138  |  103  |  45<H>  ----------------------------<  160<H>  5.0   |  21<L>  |  2.15<H>  03-18    138  |  102  |  41<H>  ----------------------------<  155<H>  4.8   |  23  |  1.96<H>  03-18    138  |  101  |  39<H>  ----------------------------<  200<H>  4.9   |  22  |  1.92<H>    Ca    9.0      21 Mar 2024 06:59  Ca    8.8      20 Mar 2024 06:44  Ca    9.3      19 Mar 2024 18:34  Phos  5.3     03-20  Mg     2.4     03-20  Mg     2.5     03-19    TPro  5.9<L>  /  Alb  3.4  /  TBili  0.3  /  DBili  x   /  AST  20  /  ALT  20  /  AlkPhos  75  03-19  TPro  7.2  /  Alb  4.0  /  TBili  0.4  /  DBili  x   /  AST  24  /  ALT  22  /  AlkPhos  93  03-18  TPro  7.3  /  Alb  4.0  /  TBili  0.5  /  DBili  x   /  AST  26  /  ALT  23  /  AlkPhos  93  03-18    CAPILLARY BLOOD GLUCOSE          PT/INR - ( 21 Mar 2024 07:00 )   PT: 14.4 sec;   INR: 1.32 ratio           Urinalysis Basic - ( 21 Mar 2024 06:59 )    Color: x / Appearance: x / SG: x / pH: x  Gluc: 102 mg/dL / Ketone: x  / Bili: x / Urobili: x   Blood: x / Protein: x / Nitrite: x   Leuk Esterase: x / RBC: x / WBC x   Sq Epi: x / Non Sq Epi: x / Bacteria: x      Culture - Blood (collected 18 Mar 2024 20:28)  Source: .Blood Blood  Preliminary Report (21 Mar 2024 01:02):    No growth at 48 Hours    Culture - Blood (collected 18 Mar 2024 20:28)  Source: .Blood Blood  Preliminary Report (21 Mar 2024 01:02):    No growth at 48 Hours      D DImer  Procalcitonin, Serum: 0.10 ng/mL (03-18 @ 19:05)      Studies    CT SCAN Chest   < from: CT Chest No Cont (03.18.24 @ 23:19) >    INTERPRETATION:  Clinical information: Shortness of breath.    CT scan of the chest was obtained without administration of intravenous   contrast.    Small low-attenuation lesion is noted in the left lobe of the thyroid   gland.    No hilar or mediastinal adenopathy is noted.    Heart is markedly enlarged in size. Calcification of the coronary   arteries noted. No pericardial effusion is noted.    No endobronchial lesions are noted. Patchy groundglass opacities are   noted within both lungs, more so in both upper lobes. Minimal compressive   atelectasis is noted involving portions of both lower lobes. This is   secondary to small bilateral pleural effusions.    Below the diaphragm, visualized portions of the abdomen demonstrate   low-attenuation lesions within both kidneys which are too small to be   adequately characterized on this exam. Thickening/nodularity of both   adrenal glands is noted.    Degenerative changes of the spine are noted.    IMPRESSION: Findings suggestive of pulmonary edema.    --- End of Report ---    < end of copied text >        < from: TTE W or WO Ultrasound Enhancing Agent (03.19.24 @ 07:33) >     CONCLUSIONS:      1. Left ventricular cavity is normal in size. Left ventricular wall thickness is normal. Left ventricular systolic function is normal with an ejection fraction of 64 % by Pendleton's method of disks. There are no regional wall motion abnormalities seen.   2. There is increased LV mass and concentric hypertrophy.   3. Unable to assess left ventricular diastolic function due to insufficient data.   4. Moderately enlarged right ventricular cavity size and normal systolic function.   5. The left atrium is severely dilated.   6. The right atrium is dilated.   7. Mild mitral regurgitation at a blood pressure of 163/69 mmHg.   8. There is moderate tricuspid regurgitation. Estimated pulmonary artery systolic pressure is 54 mmHg.   9. No pericardial effusion seen.  10. No prior echocardiogram is available for comparison.    ________________________________________________________________________________________  FINDINGS:     Left Ventricle:  The left ventricular cavity is normal in size. Left ventricular wall thickness is normal. Left ventricular systolic function is normal with a calculated ejection fraction of 64 % by the Pendleton's biplane method of disks. There are no regional wall motion abnormalities seen. Unable to assess left ventricular diastolic function due to insufficient data. Mild left ventricular hypertrophy. There is increased LV mass and concentric hypertrophy.  LV Wall Scoring: All segments are normal.          Right Ventricle:  The right ventricular cavity is moderately enlarged in size and normal systolic function. Tricuspid annular plane systolic excursion (TAPSE) is 2.4 cm (normal >=1.7 cm). Tricuspid annular tissue Doppler S' is 12.0 cm/s (normal >10 cm/s).     Left Atrium:  The left atrium is severely dilated with an indexed volume of 46 ml/m².     Right Atrium:  The right atrium is dilated with an indexed volume of 58.16 ml/m².     Interatrial Septum:  The interatrial septum appears intact.     Aortic Valve:  The aortic valve appears trileaflet with normal systolic excursion. There is no aortic valve stenosis. There is trace aortic regurgitation.     Mitral Valve:  Structurally normal mitral valve with normal leaflet excursion. There is no mitral valve stenosis. There is mild mitral regurgitation at a blood pressure of 163/69 mmHg.     Tricuspid Valve:  Structurally normal tricuspid valve with normal leaflet excursion. There is moderate tricuspid regurgitation. Estimated pulmonary artery systolic pressure is 54 mmHg.     Pulmonic Valve:  Structurally normal pulmonic valve with normal leaflet excursion. There is no pulmonic valve stenosis. There is trace pulmonic regurgitation.     Aorta:  The aortic root appears normal in size. The aortic root at the sinuses of Valsalva is normal in size, measuring 3.00 cm (indexed 1.62 cm/m²).     Pericardium:  No pericardial effusion seen.     Systemic Veins:  The inferior vena cava is normal in size (normal <2.1cm) with abnormal inspiratory collapse (abnormal <50%) consistent with mildly elevated right atrial pressure (~8, range 5-10mmHg).  ____________________________________________________________________  QUANTITATIVE DATA:  Left Ventricle Measurements: (Indexed to BSA)     IVSd (2D):   1.2 cm  LVPWd (2D):  1.2 cm  LVIDd (2D):  5.1 cm  LVIDs (2D):  3.4 cm  LV Mass:     232 g  124.7 g/m²  LV Vol d, MOD A2C: 115.0 ml 61.93 ml/m²  LV Vol d, MOD A4C: 103.0 ml 55.46 ml/m²  LV Vol d, MOD BP:  111.3 ml 59.96 ml/m²  LV Vol s, MOD A2C: 40.1 ml  21.59 ml/m²  LV Vol s, MOD A4C: 38.5 ml  20.73 ml/m²  LV Vol s, MOD BP:  39.6 ml  21.31 ml/m²  LVOT SV MOD BP:    71.8 ml  LV EF% MOD BP:     64 %    Aorta Measurements: (Normal range) (Indexed to BSA)     Sinuses of Valsalva: 3.00 cm (2.7 - 3.3 cm)       Left Atrium Measurements: (Indexed to BSA)  LA Diam 2D: 4.70 cm  LA Vol BP:  85.0 ml. 46 ml/m².    Right Ventricle Measurements: Right Atrial Measurements:     TAPSE: 2.4 cm                 RA Vol:       108.00 ml                                RA Vol Index: 58.16 ml/m²       Tricuspid Valve Measurements:     TR Vmax:          3.4 m/s  TR Peak Gradient: 45.7 mmHg  RA Pressure:      8 mmHg  PASP:           54 mmHg      < end of copied text >      rad< from: Xray Chest 1 View- PORTABLE-Urgent (Xray Chest 1 View- PORTABLE-Urgent .) (03.19.24 @ 14:21) >    COMPARISON: Chest x-ray 3/18/2024. Chest CT 3/18/2024    FINDINGS:  Interval placement of left chest wall pacemaker.  The heart size is unchanged.  Stable mild pulmonary vascular congestive changes.  Hazinessinvolving the right lower lung, corresponding to patchy   opacities better described on CT.  There is no pneumothorax. No sizable pleural effusion.    IMPRESSION:  No pneumothorax post pacemaker placement.  Stable mild pulmonary vascular congestive change.  Hazy opacities at the right lung base.    --- End of Report ---          GEO UMANA MD; Resident Radiologist  This document has been electronically signed.  ATIYA BAINS MD; Attending Radiologist  This document has been electronicallysigned. Mar 20 2024  8:40AM    < end of copied text >  < from: Xray Chest 1 View- PORTABLE-Urgent (Xray Chest 1 View- PORTABLE-Urgent .) (03.19.24 @ 14:21) >    COMPARISON: Chest x-ray 3/18/2024. Chest CT 3/18/2024    FINDINGS:  Interval placement of left chest wall pacemaker.  The heart size is unchanged.  Stable mild pulmonary vascular congestive changes.  Hazinessinvolving the right lower lung, corresponding to patchy   opacities better described on CT.  There is no pneumothorax. No sizable pleural effusion.    IMPRESSION:  No pneumothorax post pacemaker placement.  Stable mild pulmonary vascular congestive change.  Hazy opacities at the right lung base.    --- End of Report ---          GEO UMANA MD; Resident Radiologist  This document has been electronically signed.  ATIYA BAINS MD; Attending Radiologist  This document has been electronicallysigned. Mar 20 2024  8:40AM    < end of copied text >  < from: CT Chest No Cont (03.18.24 @ 23:19) >  contrast.    Small low-attenuation lesion is noted in the left lobe of the thyroid   gland.    No hilar or mediastinal adenopathy is noted.    Heart is markedly enlarged in size. Calcification of the coronary   arteries noted. No pericardial effusion is noted.    No endobronchial lesions are noted. Patchy groundglass opacities are   noted within both lungs, more so in both upper lobes. Minimal compressive   atelectasis is noted involving portions of both lower lobes. This is   secondary to small bilateral pleural effusions.    Below the diaphragm, visualized portions of the abdomen demonstrate   low-attenuation lesions within both kidneys which are too small to be   adequately characterized on this exam. Thickening/nodularity of both   adrenal glands is noted.    Degenerative changes of the spine are noted.    IMPRESSION: Findings suggestive of pulmonary edema.    --- End of Report ---            REEMA FINNEGAN MD; Attending Radiologist  This document has been electronically signed. Mar 19 2024  9:03AM    < end of copied text >

## 2024-03-22 ENCOUNTER — TRANSCRIPTION ENCOUNTER (OUTPATIENT)
Age: 89
End: 2024-03-22

## 2024-03-22 VITALS
SYSTOLIC BLOOD PRESSURE: 160 MMHG | OXYGEN SATURATION: 94 % | RESPIRATION RATE: 18 BRPM | TEMPERATURE: 98 F | DIASTOLIC BLOOD PRESSURE: 65 MMHG | HEART RATE: 53 BPM

## 2024-03-22 LAB
ANION GAP SERPL CALC-SCNC: 11 MMOL/L — SIGNIFICANT CHANGE UP (ref 5–17)
BASE EXCESS BLDV CALC-SCNC: 2.9 MMOL/L — SIGNIFICANT CHANGE UP (ref -2–3)
BUN SERPL-MCNC: 52 MG/DL — HIGH (ref 7–23)
CALCIUM SERPL-MCNC: 9.3 MG/DL — SIGNIFICANT CHANGE UP (ref 8.4–10.5)
CHLORIDE SERPL-SCNC: 103 MMOL/L — SIGNIFICANT CHANGE UP (ref 96–108)
CO2 BLDV-SCNC: 32 MMOL/L — HIGH (ref 22–26)
CO2 SERPL-SCNC: 28 MMOL/L — SIGNIFICANT CHANGE UP (ref 22–31)
CREAT SERPL-MCNC: 1.84 MG/DL — HIGH (ref 0.5–1.3)
EGFR: 26 ML/MIN/1.73M2 — LOW
GAS PNL BLDV: SIGNIFICANT CHANGE UP
GLUCOSE SERPL-MCNC: 98 MG/DL — SIGNIFICANT CHANGE UP (ref 70–99)
HCO3 BLDV-SCNC: 30 MMOL/L — HIGH (ref 22–29)
HCT VFR BLD CALC: 30.7 % — LOW (ref 34.5–45)
HGB BLD-MCNC: 9.8 G/DL — LOW (ref 11.5–15.5)
INR BLD: 1.11 RATIO — SIGNIFICANT CHANGE UP (ref 0.85–1.18)
MCHC RBC-ENTMCNC: 29.3 PG — SIGNIFICANT CHANGE UP (ref 27–34)
MCHC RBC-ENTMCNC: 31.9 GM/DL — LOW (ref 32–36)
MCV RBC AUTO: 91.6 FL — SIGNIFICANT CHANGE UP (ref 80–100)
NRBC # BLD: 0 /100 WBCS — SIGNIFICANT CHANGE UP (ref 0–0)
NT-PROBNP SERPL-SCNC: 4459 PG/ML — HIGH (ref 0–300)
PCO2 BLDV: 54 MMHG — HIGH (ref 39–42)
PH BLDV: 7.35 — SIGNIFICANT CHANGE UP (ref 7.32–7.43)
PLATELET # BLD AUTO: 335 K/UL — SIGNIFICANT CHANGE UP (ref 150–400)
PO2 BLDV: 44 MMHG — SIGNIFICANT CHANGE UP (ref 25–45)
POTASSIUM SERPL-MCNC: 4.1 MMOL/L — SIGNIFICANT CHANGE UP (ref 3.5–5.3)
POTASSIUM SERPL-SCNC: 4.1 MMOL/L — SIGNIFICANT CHANGE UP (ref 3.5–5.3)
PROTHROM AB SERPL-ACNC: 11.6 SEC — SIGNIFICANT CHANGE UP (ref 9.5–13)
RBC # BLD: 3.35 M/UL — LOW (ref 3.8–5.2)
RBC # FLD: 15.9 % — HIGH (ref 10.3–14.5)
SAO2 % BLDV: 80.1 % — SIGNIFICANT CHANGE UP (ref 67–88)
SODIUM SERPL-SCNC: 142 MMOL/L — SIGNIFICANT CHANGE UP (ref 135–145)
WBC # BLD: 11.69 K/UL — HIGH (ref 3.8–10.5)
WBC # FLD AUTO: 11.69 K/UL — HIGH (ref 3.8–10.5)

## 2024-03-22 PROCEDURE — 84295 ASSAY OF SERUM SODIUM: CPT

## 2024-03-22 PROCEDURE — 85520 HEPARIN ASSAY: CPT

## 2024-03-22 PROCEDURE — 82550 ASSAY OF CK (CPK): CPT

## 2024-03-22 PROCEDURE — 94640 AIRWAY INHALATION TREATMENT: CPT

## 2024-03-22 PROCEDURE — 85018 HEMOGLOBIN: CPT

## 2024-03-22 PROCEDURE — 85025 COMPLETE CBC W/AUTO DIFF WBC: CPT

## 2024-03-22 PROCEDURE — 93005 ELECTROCARDIOGRAM TRACING: CPT

## 2024-03-22 PROCEDURE — 84484 ASSAY OF TROPONIN QUANT: CPT

## 2024-03-22 PROCEDURE — 86850 RBC ANTIBODY SCREEN: CPT

## 2024-03-22 PROCEDURE — 83540 ASSAY OF IRON: CPT

## 2024-03-22 PROCEDURE — 96375 TX/PRO/DX INJ NEW DRUG ADDON: CPT

## 2024-03-22 PROCEDURE — 71045 X-RAY EXAM CHEST 1 VIEW: CPT | Mod: 26

## 2024-03-22 PROCEDURE — 82728 ASSAY OF FERRITIN: CPT

## 2024-03-22 PROCEDURE — 84132 ASSAY OF SERUM POTASSIUM: CPT

## 2024-03-22 PROCEDURE — 71045 X-RAY EXAM CHEST 1 VIEW: CPT

## 2024-03-22 PROCEDURE — 80053 COMPREHEN METABOLIC PANEL: CPT

## 2024-03-22 PROCEDURE — 87040 BLOOD CULTURE FOR BACTERIA: CPT

## 2024-03-22 PROCEDURE — 84100 ASSAY OF PHOSPHORUS: CPT

## 2024-03-22 PROCEDURE — 86901 BLOOD TYPING SEROLOGIC RH(D): CPT

## 2024-03-22 PROCEDURE — C1786: CPT

## 2024-03-22 PROCEDURE — 84145 PROCALCITONIN (PCT): CPT

## 2024-03-22 PROCEDURE — 85610 PROTHROMBIN TIME: CPT

## 2024-03-22 PROCEDURE — 97162 PT EVAL MOD COMPLEX 30 MIN: CPT

## 2024-03-22 PROCEDURE — 80061 LIPID PANEL: CPT

## 2024-03-22 PROCEDURE — 82553 CREATINE MB FRACTION: CPT

## 2024-03-22 PROCEDURE — 82330 ASSAY OF CALCIUM: CPT

## 2024-03-22 PROCEDURE — 83690 ASSAY OF LIPASE: CPT

## 2024-03-22 PROCEDURE — 76937 US GUIDE VASCULAR ACCESS: CPT

## 2024-03-22 PROCEDURE — 83735 ASSAY OF MAGNESIUM: CPT

## 2024-03-22 PROCEDURE — 84443 ASSAY THYROID STIM HORMONE: CPT

## 2024-03-22 PROCEDURE — C1898: CPT

## 2024-03-22 PROCEDURE — 83036 HEMOGLOBIN GLYCOSYLATED A1C: CPT

## 2024-03-22 PROCEDURE — 82435 ASSAY OF BLOOD CHLORIDE: CPT

## 2024-03-22 PROCEDURE — 87637 SARSCOV2&INF A&B&RSV AMP PRB: CPT

## 2024-03-22 PROCEDURE — 81001 URINALYSIS AUTO W/SCOPE: CPT

## 2024-03-22 PROCEDURE — 33207 INSERT HEART PM VENTRICULAR: CPT

## 2024-03-22 PROCEDURE — C1889: CPT

## 2024-03-22 PROCEDURE — 83605 ASSAY OF LACTIC ACID: CPT

## 2024-03-22 PROCEDURE — 82803 BLOOD GASES ANY COMBINATION: CPT

## 2024-03-22 PROCEDURE — 85027 COMPLETE CBC AUTOMATED: CPT

## 2024-03-22 PROCEDURE — 83880 ASSAY OF NATRIURETIC PEPTIDE: CPT

## 2024-03-22 PROCEDURE — C1892: CPT

## 2024-03-22 PROCEDURE — 93308 TTE F-UP OR LMTD: CPT

## 2024-03-22 PROCEDURE — C1769: CPT

## 2024-03-22 PROCEDURE — 96374 THER/PROPH/DIAG INJ IV PUSH: CPT

## 2024-03-22 PROCEDURE — 83550 IRON BINDING TEST: CPT

## 2024-03-22 PROCEDURE — 85730 THROMBOPLASTIN TIME PARTIAL: CPT

## 2024-03-22 PROCEDURE — 36415 COLL VENOUS BLD VENIPUNCTURE: CPT

## 2024-03-22 PROCEDURE — 93306 TTE W/DOPPLER COMPLETE: CPT

## 2024-03-22 PROCEDURE — 99285 EMERGENCY DEPT VISIT HI MDM: CPT

## 2024-03-22 PROCEDURE — 85014 HEMATOCRIT: CPT

## 2024-03-22 PROCEDURE — 80048 BASIC METABOLIC PNL TOTAL CA: CPT

## 2024-03-22 PROCEDURE — 86900 BLOOD TYPING SEROLOGIC ABO: CPT

## 2024-03-22 PROCEDURE — 71250 CT THORAX DX C-: CPT | Mod: MC

## 2024-03-22 PROCEDURE — 82947 ASSAY GLUCOSE BLOOD QUANT: CPT

## 2024-03-22 RX ORDER — WARFARIN SODIUM 2.5 MG/1
2 TABLET ORAL ONCE
Refills: 0 | Status: DISCONTINUED | OUTPATIENT
Start: 2024-03-22 | End: 2024-03-22

## 2024-03-22 RX ORDER — ACETAMINOPHEN 500 MG
2 TABLET ORAL
Qty: 0 | Refills: 0 | DISCHARGE
Start: 2024-03-22

## 2024-03-22 RX ORDER — ALBUTEROL 90 UG/1
2 AEROSOL, METERED ORAL
Qty: 1 | Refills: 0
Start: 2024-03-22 | End: 2024-04-20

## 2024-03-22 RX ORDER — ALBUTEROL 90 UG/1
3 AEROSOL, METERED ORAL
Refills: 0 | DISCHARGE

## 2024-03-22 RX ADMIN — Medication 0.1 MILLIGRAM(S): at 14:58

## 2024-03-22 RX ADMIN — AMLODIPINE BESYLATE 10 MILLIGRAM(S): 2.5 TABLET ORAL at 05:55

## 2024-03-22 RX ADMIN — Medication 0.1 MILLIGRAM(S): at 05:53

## 2024-03-22 NOTE — PROGRESS NOTE ADULT - PROBLEM SELECTOR PLAN 3
w/ heart block  -S/p PPM.
26-Mar-2021
HF exacerbation, echo with preserved EF 3/2024. Likely triggered by severe bradycardia/CHB  - diuresis as above
HF exacerbation, echo with preserved EF 3/2024. Likely triggered by severe bradycardia/CHB  - diuresis as above

## 2024-03-22 NOTE — DISCHARGE NOTE NURSING/CASE MANAGEMENT/SOCIAL WORK - NSDCPEFALRISK_GEN_ALL_CORE
For information on Fall & Injury Prevention, visit: [email or web address] OR  [email or web address] OR  https://www.cdc.gov/steadi/patient.html

## 2024-03-22 NOTE — PHARMACOTHERAPY INTERVENTION NOTE - COMMENTS
Heart Failure Medication Education      HFpEF (EF = 64% on 3/19/24)     Guideline directed medical therapy as below (name/dose/frequency):   -Diuretic: furosemide 20mg daily  -Beta Blocker: metoprolol succinate ER 25mg daily (d/c'd iso [facility])  Note: Patient with new acute diastolic heart failure 2/2 complete heart block now s/p PPM. Patient will follow up with outpatient cardiology for GDMT.    Additional medications counseled on:   acetaminophen 325 mg oral tablet: 2 tab(s) orally every 6 hours As needed Moderate Pain (4 - 6)  Albuterol (Eqv-ProAir HFA) 90 mcg/inh inhalation aerosol: 2 puff(s) inhaled every 6 hours as needed for  shortness of breath and/or wheezing  amLODIPine 10 mg oral tablet: 1 tab(s) orally once a day  cloNIDine 0.1 mg oral tablet: 1 tab(s) orally 3 times a day  simvastatin 10 mg oral tablet: 1 tab(s) orally once a day  warfarin 2.5 mg oral tablet: 1 tab(s) orally once a day    Counseled patient to observe and obtain daily weights and to notify doctor if >2-3 lbs/day or >5lbs/week weight gain, increased short of breath or using more pillows at nighttime. Answered all of the patient’s questions to the best of my ability. Patient exhibited understanding of heart failure medication regimen and management. Patient understood importance of compliance and to follow up with cardiologist outpatient after discharge.     Baldo Rosado PharmD  Transitions of Care Pharmacist  Available on [employer] Teams  Spectra #[phone]
Confirmed home medications with patient's daughter and pharmacy, updated in Outpatient Medication Review.    Home Medications:  albuterol 2.5 mg/3 mL (0.083%) inhalation solution: 3 milliliter(s) by nebulizer every 6 hours as needed for shortness of breath and/or wheezing  amLODIPine 10 mg oral tablet: 1 tab(s) orally once a day  cloNIDine 0.1 mg oral tablet: 1 tab(s) orally 3 times a day  furosemide 20 mg oral tablet: 1 tab(s) orally once a day  simvastatin 10 mg oral tablet: 1 tab(s) orally once a day  warfarin 2.5 mg oral tablet: 1 tab(s) orally once a day    Changed:  Albuterol 90mcg HFA --> 2.5 mg/3 mL (0.083%) inhalation solution: 3 milliliter(s) by nebulizer every 6 hours as needed for shortness of breath and/or wheezing  Note: patient unable to use inhaler pump    Home medications appropriately reconciled.     Marii MortonD  Transitions of Care Pharmacist  Available on Microsoft Teams  Spectra #[phone]

## 2024-03-22 NOTE — DISCHARGE NOTE NURSING/CASE MANAGEMENT/SOCIAL WORK - PATIENT PORTAL LINK FT
You can access the FollowMyHealth Patient Portal offered by [facility] by registering at the following website: [email or web address]. By joining [facility]’s FollowMyHealth portal, you will also be able to view your health information using other applications (apps) compatible with our system.

## 2024-03-22 NOTE — PROGRESS NOTE ADULT - ASSESSMENT
88 y/o F with PMH of HTN, prior CVA, ? COPD. Presented to ED from her cardiologist's office with reported bradycardia, hypoxia. Also with worsening SOB, completed course of Augmentin as an outpatient for presumed PNA. EKG on admission with HR 25-30s, junctional stefan w/ slow ventricular response and LBBB, admitted to CICU for further management.  Now s/p PPM placement on 3/19. Pulmonary called to consult for hypoxia, SOB likely in the setting of volume overload, now resolved.

## 2024-03-22 NOTE — PROGRESS NOTE ADULT - PROVIDER SPECIALTY LIST ADULT
TIFFANY
Cardiology
Internal Medicine
Internal Medicine
Nephrology
Internal Medicine
Pulmonology
Internal Medicine

## 2024-03-22 NOTE — PROGRESS NOTE ADULT - ASSESSMENT
89 year old female with PMH of HTN, prior CVA, and COPD presented to the ED from her Cardiologist office with hypoxia and bradycardia.  As per daughter, pt has been having low HRs in 30-40 was taken of Toprol, she had been refusing PPM in the past.  She has been having worsening SOB recently, was treated for pneumonia with Augmentin.  EKG showing HR in 25-30s, junctional stefan with slow ventricular response and LBBB.  Admitted to [facility] for further management.  pt started on diuretics. noticed with cr 1.9 range on admission increased to 2.48/ pt also with CHB requiring PPM. also CHF     1- DARREL on CKD   2- CHF   3- HTN     cr returning towards baseline   resume diuretics   norvasc 10 mg daily and clonidine 0.1 mg bid    anemia  supplement iron   d/w pt family at bedside   godwin Low

## 2024-03-22 NOTE — PROGRESS NOTE ADULT - SUBJECTIVE AND OBJECTIVE BOX
Occupational Therapy Daily Treatment and Progress Note   Date: 4/6/2023  Name: Arturo Page  Clinic Number: [id number]  Age: 22 m.o.    Therapy Diagnosis:   Encounter Diagnosis   Name Primary?    Sensory processing difficulty Yes     Physician: Noemy Young MD  Physician Orders: Evaluate and Treat  Medical Diagnosis: F88 (ICD-10-CM) - Sensory processing difficulty; .Feeding difficulty in child [R63.39  Evaluation Date: 11/8/2022   Insurance Authorization Period Expiration: 11/2/2022 - 12/31/2022  Plan of Care Certification Period: 11/8/2022 - 5/8/2023    Visit # / Visits authorized: 10/ 20  Time In:10:15 AM  Time Out: 11:00 AM  Total Billable Time: 45 minutes    Precautions:  Standard  Subjective     Pt / caregiver reports and Response to previous treatment: Caregiver, Jessica brought Arturo to therapy today and reported he has been doing well. He continues to have difficulty with touching novel textures and eating per parent and caregiver report. Mother asking to change occupational therapy times to accommodate additional ST session at day care. Offered options to begin week of 4/17/23.        he was compliant with home exercise program given last session.      Pain: Child too young to understand and rate pain levels. No pain behaviors or report of pain.   Objective     Arturo participated in dynamic functional therapeutic activities to improve functional performance for 45 minutes, including  Sensory Motor Activities  Vestibular, Proprioception and Tactile input through the following activities:  [x] Tolerating foam soap on hands for brief seconds, over responsive but watching occupational therapist again today.   [] Obstacle Course   [x] Platform Swing - ProneModerate Assistance patient crawling onto swing to reach for ball and tolerating movement for approximately 30 seconds again today - continues to be limited  [] Tire Swing inside of platform  swing then on mat with patient leaning over edge, but  fearful and avoiding crawling into center.   [] Bolster Swing   [] Net Swing   [x] Slide Prone and Seated today  Moderate Assistance to crawl up to top of slide with occupational therapist providing maximal support to lower extremities to facilitate knees instead of feet for reflex integration x 10 spontaneously and positive affect today.   [] Carwash - opened all rolls and patient crawling over after demonstration by occupational therapist.   [x] Trampoline - crawling on top and standing at this time.  [x] Barrel - climbing through to occupational therapist x 2 spontaneouse  [] Stepping stones  [] Foam soap       [x] Therapy ball -         holding various balls, carrying and walking with weighted balls, placing into bucket  total a to put in basket. Climbing up benches to throw ball into basket with maximal a for success  [] Rock and Roll supine to prone   [] Vibrating toy   Visual Motor Activities  [x] Puzzles  minimal /moderate a orient bubble wand to bubble container. Initially total a and then increasing independence with repetition and skilled facilitation.    [] Pre-writing     [x] Grasping     variety of ball sizes  [x] Releasing     able to push to occupational therapist x 3  [] Pincer grasp     [x] Eye-hand coordination Maximal Assistance 3 spontaneous pushing/rolling ball  [] Crossing body midline     [x] Finger isolation - to access talker today  [x] Supination     [x] Pronation        Addressed through visual attention to food   Self Help Activities  Regulation for engaging in activities of daily living.    Deep breathing through blowing bubbles with increased regulation noted as patient walking through gym instead of running after breathing activities.   Moderate/ Maximal a to place wand in front of patients lips for success  Play activities   Engagement and joint attention      Formal Testin2022 The Sensory Profile 2 provides a standardized tool for evaluating a child's sensory processing  patterns in the context of every day life, which provides a unique way to determine how sensory processing may be contributing to or interfering with participation. It is grouped into 2 main areas: 1) Sensory System scores (auditory, visual, tactile, vestibular, oral), 2) Sensory pattern scores (low registration, sensation seeking, sensory sensitivity, sensation avoiding and low threshold if applicable). Scores are interpreted as Definite Difference Less Than Others, Probable Difference Less Than Others, Typical Performance, Probable Difference More Than Others, or Definite Difference More Than Others.           Home Exercises and Education Provided     Education provided:   - Caregiver educated on current performance and POC. Caregiver verbalized understanding.  - feeding - discussed allowing patient opportunity to hold utensil and continue to work on feeding himself, allow the messiness as family can tolerate, it helps patient begin to discriminate and regulate tactile input.      Written Home Exercises Provided: yes.  Exercises were reviewed and caregiver was able to demonstrate them prior to the end of the session and displayed good  understanding of the HEP provided.     See EMR under Patient Instructions for exercises provided 11/15/2022.       Assessment     Pt was seen for an occupational therapy follow-up session. Pt with good tolerance to session with min/mod facilitation for engagement. Patient with increased engagement with occupational therapist today and exploration of sensory gym. Patient crawling up slide, sit and slide down with seldom minimal  a for safety. Exploring stairs and barrel easily today.  Continued to present with less over-responsiveness to unexpected sounds, tactile input and movements, but able to continue engaging in activities he was playing with; however over responsive to tactile input. . He continues to present with delays in these areas impacting his self help, fine motor and  SUBJECTIVE / OVERNIGHT EVENTS:          --------------------------------------------------------------------------------------------  LABS:                        9.8    11.69 )-----------( 335      ( 22 Mar 2024 07:14 )             30.7     03-22    142  |  103  |  52<H>  ----------------------------<  98  4.1   |  28  |  1.84<H>    Ca    9.3      22 Mar 2024 07:14      PT/INR - ( 22 Mar 2024 07:14 )   PT: 11.6 sec;   INR: 1.11 ratio           CAPILLARY BLOOD GLUCOSE            Urinalysis Basic - ( 22 Mar 2024 07:14 )    Color: x / Appearance: x / SG: x / pH: x  Gluc: 98 mg/dL / Ketone: x  / Bili: x / Urobili: x   Blood: x / Protein: x / Nitrite: x   Leuk Esterase: x / RBC: x / WBC x   Sq Epi: x / Non Sq Epi: x / Bacteria: x        RADIOLOGY & ADDITIONAL TESTS:    Imaging Personally Reviewed:  [x] YES  [ ] NO    Consultant(s) Notes Reviewed:  [x] YES  [ ] NO    MEDICATIONS  (STANDING):  amLODIPine   Tablet 10 milliGRAM(s) Oral daily  chlorhexidine 2% Cloths 1 Application(s) Topical at bedtime  cloNIDine 0.1 milliGRAM(s) Oral three times a day  simvastatin 10 milliGRAM(s) Oral at bedtime    MEDICATIONS  (PRN):  acetaminophen     Tablet .. 650 milliGRAM(s) Oral every 6 hours PRN Moderate Pain (4 - 6)  albuterol/ipratropium for Nebulization 3 milliLiter(s) Nebulizer every 6 hours PRN Shortness of Breath and/or Wheezing      Care Discussed with Consultants/Other Providers [x] YES  [ ] NO    Vital Signs Last 24 Hrs  T(C): 36.3 (22 Mar 2024 04:18), Max: 36.3 (21 Mar 2024 12:07)  T(F): 97.3 (22 Mar 2024 04:18), Max: 97.4 (21 Mar 2024 12:07)  HR: 53 (22 Mar 2024 05:51) (50 - 53)  BP: 178/71 (22 Mar 2024 05:51) (155/70 - 178/71)  BP(mean): --  RR: 18 (22 Mar 2024 04:18) (18 - 19)  SpO2: 96% (22 Mar 2024 04:18) (95% - 98%)    Parameters below as of 22 Mar 2024 04:18  Patient On (Oxygen Delivery Method): room air      I&O's Summary    21 Mar 2024 07:01  -  22 Mar 2024 07:00  --------------------------------------------------------  IN: 540 mL / OUT: 1100 mL / NET: -560 mL    PHYSICAL EXAM:  GENERAL: NAD, well-developed, comfortable  HEAD:  Atraumatic, Normocephalic  EYES: EOMI, PERRLA, conjunctiva and sclera clear  NECK: Supple, No JVD  CHEST/LUNG: BL Crackles, L ACW c/d/i  HEART: Regular rate and rhythm; No murmurs, rubs, or gallops  ABDOMEN: Soft, Nontender, Nondistended; Bowel sounds present  NEURO: AAOx2-3, no focal weakness, 5/5 b/l extremity strength, b/l knee no arthritis, no effusion   EXTREMITIES:  2+ Peripheral Pulses, No clubbing, cyanosis, BL LE edema  SKIN: No rashes or lesions       sensory motor skills. Arturo is progressing well towards his goals and updates to goals are listed below. Pt will continue to benefit from skilled outpatient occupational therapy to address the deficits listed in the problem list on initial evaluation to maximize pt's potential level of independence and progress toward age appropriate skills.    Pt prognosis is Excellent.  Anticipated barriers to occupational therapy: none at this time  Pt's spiritual, cultural and educational needs considered and pt agreeable to plan of care and goals.    Goals:   Short term goals:  1. Demonstrate improved tolerance of supine position as noted by lying supine for 5 minutes with out loss of state on 2/3 trials. (Initiated 11/8/2022) Progressing 4/6/2023   2. Demonstrate improved vestibular processing by tolerating movement in frontal plane without loss of state for 10 repetitions on 2/3 trials.  (Initiated 11/8/2022) Progressing 4/6/2023   3. Demonstrate improved sensory processing by tolerating infant massage on legs, stomach, arms without loss of state per parent report. (Initiated 11/8/2022) Progressing 4/6/2023      Long term goals:  Demonstrate understanding of and report ongoing adherence to home exercise program. (Initiated 11/8/2022)  2.    Demonstrate increased tolerance of bathing and drying off with towel with minimal dysregulation.(Initiated 11/8/2022) Progressing 4/6/2023   3.    Demonstrate increased tolerance of diaper changes with minimal dysregulation.(Initiated 11/8/2022) Progressing 4/6/2023   4.    Demonstrate increased tolerance of transitional movements without loss of state including transitioning into car seat without loss of state (Initiated 11/8/2022) Progressing 4/6/2023        Plan   Certification Period/Plan of care expiration: 11/8/2022 to 5/8/2023.     Occupational therapy services will be provided 1-4x/month through direct intervention, parent education and home programming. Therapy will be  SUBJECTIVE / OVERNIGHT EVENTS:    patient seen and examined  resting comfortably in bed  no c/o at this time    --------------------------------------------------------------------------------------------  LABS:                        9.8    11.69 )-----------( 335      ( 22 Mar 2024 07:14 )             30.7     03-22    142  |  103  |  52<H>  ----------------------------<  98  4.1   |  28  |  1.84<H>    Ca    9.3      22 Mar 2024 07:14      PT/INR - ( 22 Mar 2024 07:14 )   PT: 11.6 sec;   INR: 1.11 ratio           CAPILLARY BLOOD GLUCOSE            Urinalysis Basic - ( 22 Mar 2024 07:14 )    Color: x / Appearance: x / SG: x / pH: x  Gluc: 98 mg/dL / Ketone: x  / Bili: x / Urobili: x   Blood: x / Protein: x / Nitrite: x   Leuk Esterase: x / RBC: x / WBC x   Sq Epi: x / Non Sq Epi: x / Bacteria: x        RADIOLOGY & ADDITIONAL TESTS:    Imaging Personally Reviewed:  [x] YES  [ ] NO    Consultant(s) Notes Reviewed:  [x] YES  [ ] NO    MEDICATIONS  (STANDING):  amLODIPine   Tablet 10 milliGRAM(s) Oral daily  chlorhexidine 2% Cloths 1 Application(s) Topical at bedtime  cloNIDine 0.1 milliGRAM(s) Oral three times a day  simvastatin 10 milliGRAM(s) Oral at bedtime    MEDICATIONS  (PRN):  acetaminophen     Tablet .. 650 milliGRAM(s) Oral every 6 hours PRN Moderate Pain (4 - 6)  albuterol/ipratropium for Nebulization 3 milliLiter(s) Nebulizer every 6 hours PRN Shortness of Breath and/or Wheezing      Care Discussed with Consultants/Other Providers [x] YES  [ ] NO    Vital Signs Last 24 Hrs  T(C): 36.3 (22 Mar 2024 04:18), Max: 36.3 (21 Mar 2024 12:07)  T(F): 97.3 (22 Mar 2024 04:18), Max: 97.4 (21 Mar 2024 12:07)  HR: 53 (22 Mar 2024 05:51) (50 - 53)  BP: 178/71 (22 Mar 2024 05:51) (155/70 - 178/71)  BP(mean): --  RR: 18 (22 Mar 2024 04:18) (18 - 19)  SpO2: 96% (22 Mar 2024 04:18) (95% - 98%)    Parameters below as of 22 Mar 2024 04:18  Patient On (Oxygen Delivery Method): room air      I&O's Summary    21 Mar 2024 07:01  -  22 Mar 2024 07:00  --------------------------------------------------------  IN: 540 mL / OUT: 1100 mL / NET: -560 mL    PHYSICAL EXAM:  GENERAL: NAD, well-developed, comfortable  HEAD:  Atraumatic, Normocephalic  EYES: EOMI, PERRLA, conjunctiva and sclera clear  NECK: Supple, No JVD  CHEST/LUNG: BL Crackles, L ACW c/d/i  HEART: Regular rate and rhythm; No murmurs, rubs, or gallops  ABDOMEN: Soft, Nontender, Nondistended; Bowel sounds present  NEURO: AAOx2-3, no focal weakness, 5/5 b/l extremity strength, b/l knee no arthritis, no effusion   EXTREMITIES:  2+ Peripheral Pulses, No clubbing, cyanosis, BL LE edema  SKIN: No rashes or lesions       discontinued when child has met all goals, is not making progress, parent discontinues therapy, and/or for any other applicable reasons    ALEKSANDAR Mejia  4/6/2023

## 2024-03-22 NOTE — PROGRESS NOTE ADULT - NS ATTEND AMEND GEN_ALL_CORE FT
Patient care and plan discussed and reviewed with Advanced Care Provider. Plan as outlined above edited by me to reflect our discussion.
she seems  OK: no sob:  no wheezing:  for dc today

## 2024-03-22 NOTE — DISCHARGE NOTE PROVIDER - HOSPITAL COURSE
HPI:  89 year old female with PMH of HTN, prior CVA, and COPD presented to the ED from her Cardiologist office with hypoxia and bradycardia.  As per daughter, pt has been having low HRs in 30-40 was taken of Toprol, she had been refusing PPM in the past.  She has been having worsening SOB recently, was treated for pneumonia with Augmentin.  EKG showing HR in 25-30s, junctional stefan with slow ventricular response and LBBB.  Admitted to [facility] for further management.  (18 Mar 2024 23:28)    Hospital Course: 89 year old female with PMHx Afib on Coumadin, CVA in 2012 and HTN. Presents to [facility] from her Cardiologist office with hypoxia and bradycardia, now s/p PPM 3/19 for CHB, still with volume overload requiring supplemental O2.     # Bradycardia 2/2 CHB:  - S/p PPM 3/19     # Acute hypoxemic respiratory failure 2/2 pulmonary edema from heart failure: improving  - CT chest 3/18 with pulmonary edema, small b/l effusions  - s/p Bumex 2mg IVP BID 3/20  - Diuretics being held 2/2 DARREL  - Strict I&Os/DW  - titrate O2 to maintain SaO2 > 92% (down to 3L NC)  - Duoneb Q6H PRN  - BNP 22,401 on admission now 4459    # Acute heart failure:  - HF exacerbation, echo with preserved EF 3/2024. Likely triggered by severe bradycardia/CHB  - Diuresis per Cards    # DARREL: improving  - Baseline Cr around 1.2, likely from acute HF exacerbation/low flow state  - Monitor I/O's  - Serial Cr  - Avoid nephrotoxins  - Renally dose medications  - Continue to monitor  - Renal following    # Urine Retention:  - s/p julián, passed TOV 3/21    # HTN/PAF::  - C/w CV meds  - Coumadin resumed    #CVA:  - Prior CVA in 2012, with resultant memory issues  - Not on ASA due to being on full AC  - C/w statin    # DVT ppx:  - IPCs  - Coumadin     Dispo: [facility], family wants home      Important Medication Changes and Reason:    Active or Pending Issues Requiring Follow-up:    Advanced Directives:   [ ] Full code  [ ] DNR  [ ] Hospice    Discharge Diagnoses:         HPI:  89 year old female with PMH of HTN, prior CVA, and COPD presented to the ED from her Cardiologist office with hypoxia and bradycardia.  As per daughter, pt has been having low HRs in 30-40 was taken of Toprol, she had been refusing PPM in the past.  She has been having worsening SOB recently, was treated for pneumonia with Augmentin.  EKG showing HR in 25-30s, junctional stefan with slow ventricular response and LBBB.  Admitted to [facility] for further management.  (18 Mar 2024 23:28)    Hospital Course: 89 year old female with PMHx Afib on Coumadin, CVA in 2012 and HTN. Presents to [facility] from her Cardiologist office with hypoxia and bradycardia, now s/p PPM 3/19 for CHB, still with volume overload requiring supplemental O2.     # Bradycardia 2/2 CHB:  - S/p PPM 3/19     # Acute hypoxemic respiratory failure 2/2 pulmonary edema from heart failure: improving  - CT chest 3/18 with pulmonary edema, small b/l effusions  - s/p Bumex 2mg IVP BID 3/20  - Diuretics being held 2/2 DARREL  - Strict I&Os/DW  - titrate O2 to maintain SaO2 > 92% (down to 3L NC)  - Duoneb Q6H PRN  - BNP 22,401 on admission now 4459    # Acute heart failure:  - HF exacerbation, echo with preserved EF 3/2024. Likely triggered by severe bradycardia/CHB  - Holding diuretics in the setting of DARREL  -Currently euvolemic  Follow-up with outpatient Cardiologist     # DARREL: improving  - Baseline Cr around 1.2, likely from acute HF exacerbation/low flow state  - Avoid nephrotoxins  - Renally dose medications  Improving, outpatient follow-up     # Urine Retention:  - s/p gallego, passed TOV 3/21    # HTN/PAF::  - C/w CV meds  - Coumadin resumed    #CVA:  - Prior CVA in 2012, with resultant memory issues  - Not on ASA due to being on full AC  - C/w statin    # DVT ppx:  - IPCs  - Coumadin     Dispo: [facility], family wants home      Important Medication Changes and Reason:    Active or Pending Issues Requiring Follow-up:  Follow-up with your Primary Care Doctor and Cardiologist     Advanced Directives:   [ X] Full code  [ ] DNR  [ ] Hospice    Discharge Diagnoses:  CHB  CHF  DARREL  Afib   HPI:  89 year old female with PMH of HTN, prior CVA, and COPD presented to the ED from her Cardiologist office with hypoxia and bradycardia.  As per daughter, pt has been having low HRs in 30-40 was taken of Toprol, she had been refusing PPM in the past.  She has been having worsening SOB recently, was treated for pneumonia with Augmentin.  EKG showing HR in 25-30s, junctional stefan with slow ventricular response and LBBB.  Admitted to [facility] for further management.  (18 Mar 2024 23:28)    Hospital Course: 89 year old female with PMHx Afib on Coumadin, CVA in 2012 and HTN. 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     # Bradycardia 2/2 CHB:  - S/p PPM 3/19     # Acute hypoxemic respiratory failure 2/2 pulmonary edema from heart failure: improving  - CT chest 3/18 with pulmonary edema, small b/l effusions  - s/p Bumex 2mg IVP BID 3/20  - Diuretics being held 2/2 DARREL  - Strict I&Os/DW  - titrate O2 to maintain SaO2 > 92% (down to 3L NC)  - Duoneb Q6H PRN  - BNP 22,401 on admission now 4459  Resume home lasix on discharge    # Acute heart failure:  - HF exacerbation, echo with preserved EF 3/2024. Likely triggered by severe bradycardia/CHB  - Holding diuretics in the setting of DARREL  -Currently euvolemic  Follow-up with outpatient Cardiologist     # DARREL: improving  - Baseline Cr around 1.2, likely from acute HF exacerbation/low flow state  - Avoid nephrotoxins  - Renally dose medications  Improving, outpatient follow-up     # Urine Retention:  - s/p julián, passed TOV 3/21    # HTN/PAF::  - C/w CV meds  - Coumadin resumed    #CVA:  - Prior CVA in 2012, with resultant memory issues  - Not on ASA due to being on full AC  - C/w statin    # DVT ppx:  - IPCs  - Coumadin     Dispo: [facility], family wants home      Important Medication Changes and Reason:    Active or Pending Issues Requiring Follow-up:  Follow-up with your Primary Care Doctor and Cardiologist     Advanced Directives:   [ X] Full code  [ ] DNR  [ ] Hospice    Discharge Diagnoses:  CHB  CHF  DARREL  Afib

## 2024-03-22 NOTE — PROGRESS NOTE ADULT - ASSESSMENT
Patient is a 89 year old female with PMHx Afib on Coumadin, CVA in 2012 and HTN. 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     # Bradycardia 2/2 CHB:  - S/p PPM 3/19     # Acute hypoxemic respiratory failure 2/2 pulmonary edema from heart failure: improving  - CT chest 3/18 with pulmonary edema, small b/l effusions  - s/p Bumex 2mg IVP BID 3/20  - Diuretics being held 2/2 DARREL  - Strict I&Os/DW  - titrate O2 to maintain SaO2 > 92% (down to 3L NC)  - Duoneb Q6H PRN  - BNP 22,401 on admission now 4459  - CXR to fu pulm edema pending  - Cards/Pulm following    # Acute heart failure:  - HF exacerbation, echo with preserved EF 3/2024. Likely triggered by severe bradycardia/CHB  - Diuresis per Cards, consult pending    # DARREL: improving  - Baseline Cr around 1.2, likely from acute HF exacerbation/low flow state  - Monitor I/O's  - Serial Cr  - Avoid nephrotoxins  - Renally dose medications  - Continue to monitor  - Renal following    # Urine Retention:  - s/p gallego, passed TOV 3/21    # HTN/PAF::  - C/w CV meds  - Coumadin resumed    #CVA:  - Prior CVA in 2012, with resultant memory issues  - Not on ASA due to being on full AC  - C/w statin    # DVT ppx:  - IPCs  - Coumadin     Dispo: [facility], family wants home    [employer]  [phone]

## 2024-03-22 NOTE — DISCHARGE NOTE PROVIDER - NSDCFUADDAPPT_GEN_ALL_CORE_FT
APPTS ARE READY TO BE MADE: [ X] YES    Best Family or Patient Contact (if needed):    Additional Information about above appointments (if needed):    1: Dr. Goldberg	  2: Dr. Reich  3:     Other comments or requests:    APPTS ARE READY TO BE MADE: [ X] YES    Best Family or Patient Contact (if needed):    Additional Information about above appointments (if needed):    1: Dr. Goldberg	  2: Dr. Reich  3:     Other comments or requests:   Patient was outreached but did not answer. A voicemail was left for the patient to return our call.

## 2024-03-22 NOTE — DISCHARGE NOTE NURSING/CASE MANAGEMENT/SOCIAL WORK - NSDCFUADDAPPT_GEN_ALL_CORE_FT
APPTS ARE READY TO BE MADE: [ X] YES    Best Family or Patient Contact (if needed):    Additional Information about above appointments (if needed):    1: Dr. Goldberg	  2: Dr. Reich  3:     Other comments or requests:

## 2024-03-22 NOTE — PROGRESS NOTE ADULT - SUBJECTIVE AND OBJECTIVE BOX
Date of Service: 03-22-24 @ 15:18    Patient is a 89y old  Female who presents with a chief complaint of Bradycardia (22 Mar 2024 12:54)    Any change in ROS:   No new respiratory events overnight. Denies SOB/CP.  O2 sats 94% RA     MEDICATIONS  (STANDING):  amLODIPine   Tablet 10 milliGRAM(s) Oral daily  chlorhexidine 2% Cloths 1 Application(s) Topical at bedtime  cloNIDine 0.1 milliGRAM(s) Oral three times a day  simvastatin 10 milliGRAM(s) Oral at bedtime  warfarin 2 milliGRAM(s) Oral once    MEDICATIONS  (PRN):  acetaminophen     Tablet .. 650 milliGRAM(s) Oral every 6 hours PRN Moderate Pain (4 - 6)  albuterol/ipratropium for Nebulization 3 milliLiter(s) Nebulizer every 6 hours PRN Shortness of Breath and/or Wheezing    Vital Signs Last 24 Hrs  T(C): 36.6 (22 Mar 2024 12:02), Max: 36.6 (22 Mar 2024 12:02)  T(F): 97.9 (22 Mar 2024 12:02), Max: 97.9 (22 Mar 2024 12:02)  HR: 53 (22 Mar 2024 12:02) (50 - 53)  BP: 160/65 (22 Mar 2024 12:02) (155/70 - 178/71)  BP(mean): --  RR: 18 (22 Mar 2024 12:02) (18 - 18)  SpO2: 94% (22 Mar 2024 12:02) (94% - 98%)    Parameters below as of 22 Mar 2024 12:02  Patient On (Oxygen Delivery Method): room air    I&O's Summary    21 Mar 2024 07:01  -  22 Mar 2024 07:00  --------------------------------------------------------  IN: 540 mL / OUT: 1100 mL / NET: -560 mL    Physical Exam:   GENERAL: NAD  HEENT: BURKE  ENMT: No tonsillar erythema, exudates, or enlargement  NECK: Supple, No JVD  CHEST/LUNG: few basilar crackles   CVS: Regular rate and rhythm  GI: : Soft, Nontender, Nondistended  NERVOUS SYSTEM:  Alert & Oriented   EXTREMITIES:  2+ Peripheral Pulses, No clubbing, cyanosis, or edema  SKIN: No rashes or lesions  PSYCH: Appropriate    Labs:  30, 26, 19, 26                            9.8    11.69 )-----------( 335      ( 22 Mar 2024 07:14 )             30.7                         9.2    13.28 )-----------( 320      ( 21 Mar 2024 06:59 )             28.9                         8.7    11.99 )-----------( 312      ( 20 Mar 2024 06:45 )             28.1                         9.2    15.56 )-----------( 350      ( 19 Mar 2024 18:34 )             29.6                         8.5    13.40 )-----------( 309      ( 19 Mar 2024 07:30 )             26.8                         10.0   15.61 )-----------( 473      ( 18 Mar 2024 19:05 )             31.9     03-22    142  |  103  |  52<H>  ----------------------------<  98  4.1   |  28  |  1.84<H>  03-21    138  |  101  |  62<H>  ----------------------------<  102<H>  4.7   |  24  |  2.48<H>  03-20    144  |  103  |  45<H>  ----------------------------<  94  4.4   |  30  |  2.13<H>  03-19    140  |  102  |  44<H>  ----------------------------<  120<H>  4.9   |  27  |  2.17<H>  03-19    138  |  103  |  45<H>  ----------------------------<  160<H>  5.0   |  21<L>  |  2.15<H>  03-18    138  |  102  |  41<H>  ----------------------------<  155<H>  4.8   |  23  |  1.96<H>  03-18    138  |  101  |  39<H>  ----------------------------<  200<H>  4.9   |  22  |  1.92<H>    Ca    9.3      22 Mar 2024 07:14  Ca    9.0      21 Mar 2024 06:59    TPro  5.9<L>  /  Alb  3.4  /  TBili  0.3  /  DBili  x   /  AST  20  /  ALT  20  /  AlkPhos  75  03-19  TPro  7.2  /  Alb  4.0  /  TBili  0.4  /  DBili  x   /  AST  24  /  ALT  22  /  AlkPhos  93  03-18  TPro  7.3  /  Alb  4.0  /  TBili  0.5  /  DBili  x   /  AST  26  /  ALT  23  /  AlkPhos  93  03-18    PT/INR - ( 22 Mar 2024 07:14 )   PT: 11.6 sec;   INR: 1.11 ratio    Urinalysis Basic - ( 22 Mar 2024 07:14 )    Color: x / Appearance: x / SG: x / pH: x  Gluc: 98 mg/dL / Ketone: x  / Bili: x / Urobili: x   Blood: x / Protein: x / Nitrite: x   Leuk Esterase: x / RBC: x / WBC x   Sq Epi: x / Non Sq Epi: x / Bacteria: x    Procalcitonin, Serum: 0.10 ng/mL (03-18 @ 19:05)    RECENT CULTURES:  03-18 @ 20:28 .Blood Blood     No growth at 72 Hours    Studies  < from: CT Chest No Cont (03.18.24 @ 23:19) >  INTERPRETATION:  Clinical information: Shortness of breath.    CT scan of the chest was obtained without administration of intravenous   contrast.    Small low-attenuation lesion is noted in the left lobe of the thyroid   gland.    No hilar or mediastinal adenopathy is noted.    Heart is markedly enlarged in size. Calcification of the coronary   arteries noted. No pericardial effusion is noted.    No endobronchial lesions are noted. Patchy groundglass opacities are   noted within both lungs, more so in both upper lobes. Minimal compressive   atelectasis is noted involving portions of both lower lobes. This is   secondary to small bilateral pleural effusions.    Below the diaphragm, visualized portions of the abdomen demonstrate   low-attenuation lesions within both kidneys which are too small to be   adequately characterized on this exam. Thickening/nodularity of both   adrenal glands is noted.    Degenerative changes of the spine are noted.    IMPRESSION: Findings suggestive of pulmonary edema.    --- End of Report ---    < end of copied text >

## 2024-03-22 NOTE — PROGRESS NOTE ADULT - SUBJECTIVE AND OBJECTIVE BOX
DATE OF SERVICE: 03-22-24 @ 12:54    Patient is a 89y old  Female who presents with a chief complaint of Bradycardia (22 Mar 2024 11:24)      INTERVAL HISTORY: feels okay    REVIEW OF SYSTEMS:  CONSTITUTIONAL: No weakness  EYES/ENT: No visual changes;  No throat pain   NECK: No pain or stiffness  RESPIRATORY: No cough, wheezing; No shortness of breath  CARDIOVASCULAR: No chest pain or palpitations  GASTROINTESTINAL: No abdominal  pain. No nausea, vomiting, or hematemesis  GENITOURINARY: No dysuria, frequency or hematuria  NEUROLOGICAL: No stroke like symptoms  SKIN: No rashes    TELEMETRY Personally reviewed:  	  MEDICATIONS:  amLODIPine   Tablet 10 milliGRAM(s) Oral daily  cloNIDine 0.1 milliGRAM(s) Oral three times a day        PHYSICAL EXAM:  T(C): 36.6 (03-22-24 @ 12:02), Max: 36.6 (03-22-24 @ 12:02)  HR: 53 (03-22-24 @ 12:02) (50 - 53)  BP: 160/65 (03-22-24 @ 12:02) (155/70 - 178/71)  RR: 18 (03-22-24 @ 12:02) (18 - 18)  SpO2: 94% (03-22-24 @ 12:02) (94% - 98%)  Wt(kg): --  I&O's Summary    21 Mar 2024 07:01  -  22 Mar 2024 07:00  --------------------------------------------------------  IN: 540 mL / OUT: 1100 mL / NET: -560 mL          Appearance: In no distress	  HEENT:    PERRL, EOMI	  Cardiovascular:  S1 S2, No JVD  Respiratory: Lungs clear to auscultation	  Gastrointestinal:  Soft, Non-tender, + BS	  Vascularature:  No edema of LE  Psychiatric: Appropriate affect   Neuro: no acute focal deficits                               9.8    11.69 )-----------( 335      ( 22 Mar 2024 07:14 )             30.7     03-22    142  |  103  |  52<H>  ----------------------------<  98  4.1   |  28  |  1.84<H>    Ca    9.3      22 Mar 2024 07:14          Labs personally reviewed      ASSESSMENT/PLAN: 	  Patient is a 89 year old female with PMHx Afib on Coumadin, CVA in 2012 and HTN. Presents to [facility] from her Cardiologist office with hypoxia and bradycardia, now s/p PPM 3/19 for CHB, still with volume overload requiring supplemental O2.     1.  CHB:  - S/p PPM 3/19     2. Acute diastolic HF likely 2/2 CHB  - s/p Bumex 2mg IVP BID 3/20  - now euvolemic 95% on RA.  Today on supplemental O2 3LNC  - await CXR results  - monitor strict I&Os    3. DARREL:  - Baseline Cr around 1.2, likely from acute HF exacerbation/low flow state  - creat down today 3/22 to 1.84 from 2.48  - Renal recs appreciated  - hold diuretics    4. PAF:  - Resume AC  - Paced at 50bpm    5. DVT ppx:  - On AC            Iolani Behrbom, AG-NP   Renan Cortez DO formerly [facility]  Cardiovascular Medicine  [address], [address]  Available through call or text on [employer] TEAM[employer]  Office: [phone]   DATE OF SERVICE: 03-22-24 @ 12:54    Patient is a 89y old  Female who presents with a chief complaint of Bradycardia (22 Mar 2024 11:24)      INTERVAL HISTORY: feels okay    REVIEW OF SYSTEMS:  CONSTITUTIONAL: No weakness  EYES/ENT: No visual changes;  No throat pain   NECK: No pain or stiffness  RESPIRATORY: No cough, wheezing; No shortness of breath  CARDIOVASCULAR: No chest pain or palpitations  GASTROINTESTINAL: No abdominal  pain. No nausea, vomiting, or hematemesis  GENITOURINARY: No dysuria, frequency or hematuria  NEUROLOGICAL: No stroke like symptoms  SKIN: No rashes    TELEMETRY Personally reviewed: Vpaced 50s  	  MEDICATIONS:  amLODIPine   Tablet 10 milliGRAM(s) Oral daily  cloNIDine 0.1 milliGRAM(s) Oral three times a day        PHYSICAL EXAM:  T(C): 36.6 (03-22-24 @ 12:02), Max: 36.6 (03-22-24 @ 12:02)  HR: 53 (03-22-24 @ 12:02) (50 - 53)  BP: 160/65 (03-22-24 @ 12:02) (155/70 - 178/71)  RR: 18 (03-22-24 @ 12:02) (18 - 18)  SpO2: 94% (03-22-24 @ 12:02) (94% - 98%)  Wt(kg): --  I&O's Summary    21 Mar 2024 07:01  -  22 Mar 2024 07:00  --------------------------------------------------------  IN: 540 mL / OUT: 1100 mL / NET: -560 mL          Appearance: In no distress	  HEENT:    PERRL, EOMI	  Cardiovascular:  S1 S2, No JVD  Respiratory: Lungs clear to auscultation	  Gastrointestinal:  Soft, Non-tender, + BS	  Vascularature:  No edema of LE  Psychiatric: Appropriate affect   Neuro: no acute focal deficits                               9.8    11.69 )-----------( 335      ( 22 Mar 2024 07:14 )             30.7     03-22    142  |  103  |  52<H>  ----------------------------<  98  4.1   |  28  |  1.84<H>    Ca    9.3      22 Mar 2024 07:14          Labs personally reviewed      ASSESSMENT/PLAN: 	  Patient is a 89 year old female with PMHx Afib on Coumadin, CVA in 2012 and HTN. Presents to [facility] from her Cardiologist office with hypoxia and bradycardia, now s/p PPM 3/19 for CHB, still with volume overload requiring supplemental O2.     1.  CHB:  - S/p PPM 3/19     2. Acute diastolic HF likely 2/2 CHB  - s/p Bumex 2mg IVP BID 3/20  - now euvolemic 95% on RA.  Today on supplemental O2 3LNC  - await CXR results  - monitor strict I&Os    3. DARREL:  - Baseline Cr around 1.2, likely from acute HF exacerbation/low flow state  - creat down today 3/22 to 1.84 from 2.48  - Renal recs appreciated  - hold diuretics    4. PAF:  - Resume AC  - Paced at 50bpm    5. DVT ppx:  - On AC            Iolani Behrbom, AG-NP   Renan Cortez DO [facility]  Cardiovascular Medicine  [address] [place] [address], [address]  Available through call or text on [employer] TE[employer]  Office: [phone]

## 2024-03-22 NOTE — DISCHARGE NOTE PROVIDER - PROVIDER TOKENS
PROVIDER:[TOKEN:[[id number]],FOLLOWUP:[1 week]],PROVIDER:[TOKEN:[[id number]],FOLLOWUP:[1 week]] PROVIDER:[TOKEN:[[id number]],FOLLOWUP:[1 week]],PROVIDER:[TOKEN:[[id number]],FOLLOWUP:[1 week]],PROVIDER:[TOKEN:[[id number]]]

## 2024-03-22 NOTE — PROGRESS NOTE ADULT - PROBLEM SELECTOR PLAN 1
- S/p PPM 3/19   - f/u EP recs  - monitor on tele
- S/p PPM 3/19   - f/u EP recs  - monitor on tele
Likely 2nd to fluid overload  -CT chest 3/18 with pulmonary edema, small b/l effusions. ProBNP >22,000  -CXR this AM with improving congestion  -VBG acceptable   -SOB, hypoxia now resolved. No c/o dyspnea at this time  -Keep O>I as tolerated  -Duoneb q6h PRN for SOB/wheezing  -Keep sats >90% with O2 PRN (currently RA)  -D/c planning per primary team.

## 2024-03-22 NOTE — CHART NOTE - NSCHARTNOTEFT_GEN_A_CORE
Discharge Note:    Requested by Dr. Low to facilitate d/c home.  Cleared by Cardiology (will discharge on home lasix dose)  V/s, labs, diagnostics reviewed, pt stable to d/c now.  Dr. Low medically cleared w/ f/u as directed. Please refer to d/c note for hospital details.    Zahra Peñaloza NP-c

## 2024-03-22 NOTE — DISCHARGE NOTE PROVIDER - CARE PROVIDERS DIRECT ADDRESSES
,[email or web address]

## 2024-03-22 NOTE — DISCHARGE NOTE PROVIDER - NSDCCPCAREPLAN_GEN_ALL_CORE_FT
PRINCIPAL DISCHARGE DIAGNOSIS  Diagnosis: Bradycardia  Assessment and Plan of Treatment: Due to complete heart block  s/p pacemaker insertion  Follow-up With Electrophysiology      SECONDARY DISCHARGE DIAGNOSES  Diagnosis: Acute heart failure  Assessment and Plan of Treatment: Take medication as directed  Follow-up with your Cardiologist    Diagnosis: Acute kidney injury superimposed on CKD  Assessment and Plan of Treatment: Avoid taking (NSAIDs) - (ex: Ibuprofen, Advil, Celebrex, Naprosyn)  Avoid taking any nephrotoxic agents (can harm kidneys) - Intravenous contrast for diagnostic testing, combination cold medications.  Have all medications adjusted for your renal function by your Health Care Provider.  Blood pressure control is important.  Take all medication as prescribed.      Diagnosis: Paroxysmal atrial fibrillation  Assessment and Plan of Treatment: Atrial fibrillation is the most common heart rhythm problem.  The condition puts you at risk for has stroke and heart attack  You were started on blood thinners to prevent possible clot and stroke complications.   Monitor for any signs of bleeding and avoid injury while on this medication  If any bleeding persistent and symptomatic stop the medication and notify your doctor immediately.  It helps if you control your blood pressure, not drink more than 1-2 alcohol drinks per day, cut down on caffeine, getting treatment for over active thyroid gland, and get regular exercise  Call your doctor if you feel your heart racing or beating unusually, chest tightness or pain, lightheaded, faint, shortness of breath especially with exercise  It is important to take your heart medication as prescribed  You may be on anticoagulation which is very important to take as directed - you may need blood work to monitor drug levels

## 2024-03-22 NOTE — PROGRESS NOTE ADULT - SUBJECTIVE AND OBJECTIVE BOX
[facility]   [phone]  RENAL FOLLOW UP NOTE  --------------------------------------------------------------------------------  Chief Complaint:    24 hour events/subjective:    seen earlier  states feels better sob is better     PAST HISTORY  --------------------------------------------------------------------------------  No significant changes to PMH, PSH, FHx, SHx, unless otherwise noted    ALLERGIES & MEDICATIONS  --------------------------------------------------------------------------------  Allergies    No Known Allergies    Intolerances      Standing Inpatient Medications  amLODIPine   Tablet 10 milliGRAM(s) Oral daily  chlorhexidine 2% Cloths 1 Application(s) Topical at bedtime  cloNIDine 0.1 milliGRAM(s) Oral three times a day  simvastatin 10 milliGRAM(s) Oral at bedtime  warfarin 2 milliGRAM(s) Oral once    PRN Inpatient Medications  acetaminophen     Tablet .. 650 milliGRAM(s) Oral every 6 hours PRN  albuterol/ipratropium for Nebulization 3 milliLiter(s) Nebulizer every 6 hours PRN      REVIEW OF SYSTEMS  --------------------------------------------------------------------------------    Gen: denies  fevers/chills,  CVS: denies chest pain/palpitations  Resp: denies SOB/Cough  GI: Denies N/V/Abd pain  : Denies dysuria    VITALS/PHYSICAL EXAM  --------------------------------------------------------------------------------  T(C): 36.6 (03-22-24 @ 12:02), Max: 36.6 (03-22-24 @ 12:02)  HR: 53 (03-22-24 @ 12:02) (50 - 53)  BP: 160/65 (03-22-24 @ 12:02) (155/70 - 178/71)  RR: 18 (03-22-24 @ 12:02) (18 - 18)  SpO2: 94% (03-22-24 @ 12:02) (94% - 98%)  Wt(kg): --    Weight (kg): 82.645 (03-21-24 @ 07:36)      03-21-24 @ 07:01  -  03-22-24 @ 07:00  --------------------------------------------------------  IN: 540 mL / OUT: 1100 mL / NET: -560 mL      Physical Exam:  	    Gen: Non toxic comfortable appearing   	no jvd   	Pulm: decrease bs  no rales or ronchi or wheezing  	CV: RRR, S1S2; no rub  	Abd: +BS, soft, nontender/nondistended  	: No suprapubic tenderness  	UE: Warm, no cyanosis  no clubbing,  no edema  	LE: Warm, no cyanosis  no clubbing, 1+  edema  	Neuro: alert and oriented. speech coherent       LABS/STUDIES  --------------------------------------------------------------------------------              9.8    11.69 >-----------<  335      [03-22-24 @ 07:14]              30.7     142  |  103  |  52  ----------------------------<  98      [03-22-24 @ 07:14]  4.1   |  28  |  1.84        Ca     9.3     [03-22-24 @ 07:14]      PT/INR: PT 11.6 , INR 1.11       [03-22-24 @ 07:14]      Creatinine Trend:  SCr 1.84 [03-22 @ 07:14]  SCr 2.48 [03-21 @ 06:59]  SCr 2.13 [03-20 @ 06:44]  SCr 2.17 [03-19 @ 18:34]  SCr 2.15 [03-19 @ 07:30]    Iron 20, TIBC 263, %sat 8      [03-20-24 @ 06:42]  Ferritin 89      [03-20-24 @ 06:42]  TSH 2.19      [03-19-24 @ 00:53]  Lipid: chol 142, , HDL 63, LDL --      [03-19-24 @ 00:53]

## 2024-03-22 NOTE — DISCHARGE NOTE PROVIDER - CARE PROVIDER_API CALL
Goldberg, Steven M  Cardiovascular Disease  [address] [place] [address]  Phone: [phone]  Fax: [phone]  Follow Up Time: 1 week    Hardy Reich  Cardiac Electrophysiology  [address] [facility], [facility] [address]  [place] [address]  Phone: [phone]  Fax: [phone]  Follow Up Time: 1 week   Goldberg, Steven M  Cardiovascular Disease  [address] [place] [address]  Phone: [phone]  Fax: [phone]  Follow Up Time: 1 week    Hardy Reich  Cardiac Electrophysiology  [address] [facility], [facility] [address]  [place] [address]  Phone: [phone]  Fax: [phone]  Follow Up Time: 1 week    Yesenia Dillon  Nephrology  [address] [place] [address] [address]  [place] [address]  Phone: [phone]  Fax: [phone]  Follow Up Time:

## 2024-03-22 NOTE — PROGRESS NOTE ADULT - PROBLEM SELECTOR PLAN 2
2/2 pulmonary edema from heart failure  - will continue with bumex 2 mg IVP bid for now  - monitor strict I&Os  - daily standing weights
CT chest 3/18 with pulmonary edema, small b/l effusions  -TTE reviewed  -ProBNP >22,000 on admission, now downtrending  -CXR this AM with improving congestion   -Keep O>I as tolerated.
2/2 pulmonary edema from heart failure, improving  - ordered for bumex 2 mg IVP bid 3/20  - assess need for further diuretic 3/21  - monitor strict I&Os  - daily standing weights  - titrate O2 to maintain SaO2 > 92% (down to 3L NC)

## 2024-03-22 NOTE — PROGRESS NOTE ADULT - PROBLEM SELECTOR PLAN 4
vs COPD  -Former smoker  -Normoxic, no c/o dyspnea  -Duoneb q6h PRN, can resume Albuterol HFA q6h PRN on discharge   -VBG acceptable
Baseline Cr around 1.2, likely from acute HF exacerbation/low flow state  - Monitor labs and urine output.   - Avoid NSAIDs, ACEI/ARBS, RCA and nephrotoxins.   - Renally dose medications
Baseline Cr around 1.2, likely from HF  - Monitor labs and urine output.   - Avoid NSAIDs, ACEI/ARBS, RCA and nephrotoxins.   - Renally dose medications

## 2024-03-24 LAB
CULTURE RESULTS: SIGNIFICANT CHANGE UP
CULTURE RESULTS: SIGNIFICANT CHANGE UP
SPECIMEN SOURCE: SIGNIFICANT CHANGE UP
SPECIMEN SOURCE: SIGNIFICANT CHANGE UP

## 2024-03-27 NOTE — CHART NOTE - NSCHARTNOTEFT_GEN_A_CORE
Patient was outreached but did not answer. A voicemail was left for the patient to return our call.   [phone] - only number on file - also listed as daughter's number.

## 2024-03-28 NOTE — CHART NOTE - NSCHARTNOTESELECT_GEN_ALL_CORE
Electrophysiology/Event Note
Event Note
Discharge/Event Note
Transfer Note
dc appt/Event Note
dc appt/Event Note